# Patient Record
Sex: FEMALE | NOT HISPANIC OR LATINO | Employment: OTHER | URBAN - METROPOLITAN AREA
[De-identification: names, ages, dates, MRNs, and addresses within clinical notes are randomized per-mention and may not be internally consistent; named-entity substitution may affect disease eponyms.]

---

## 2023-05-24 ENCOUNTER — PATIENT OUTREACH (OUTPATIENT)
Dept: CARE COORDINATION | Facility: CLINIC | Age: 83
End: 2023-05-24
Payer: MEDICARE

## 2023-05-25 NOTE — PROGRESS NOTES
Outreach call to patient to support a smooth transition of care from recent admission.  Spoke with patient, reviewed discharge medications, discharge instructions, assessed social needs, and provided education on importance of follow-up appointment with provider. Enrolled patient in [employer] chatbot for additional support and education through transition period.  Will continue to monitor through transition period.  JESSICA Pablo, RN.     Engagement  Call Start Time: 1500 (5/24/2023  3:38 PM)    Medications  Medications reviewed with patient/caregiver?: Yes (5/24/2023  3:38 PM)  Is the patient having any side effects they believe may be caused by any medication additions or changes?: No (5/24/2023  3:38 PM)  Does the patient have all medications ordered at discharge?: Yes (5/24/2023  3:38 PM)  Care Management Interventions: No intervention needed (5/24/2023  3:38 PM)  Is the patient taking all medications as directed (includes completed medication regime)?: Yes (5/24/2023  3:38 PM)    Appointments  Does the patient have a primary care provider?: Yes (5/24/2023  3:38 PM)  Care Management Interventions: Advised patient to make appointment (5/24/2023  3:38 PM)  Has the patient kept scheduled appointments due by today?: Yes (5/24/2023  3:38 PM)  Care Management Interventions: Advised to schedule with specialist (5/24/2023  3:38 PM)    Self Management  What is the home health agency?: None Ordered (5/24/2023  3:38 PM)  Has home health visited the patient within 72 hours of discharge?: Not applicable (5/24/2023  3:38 PM)  What Durable Medical Equipment (DME) was ordered?: None Ordered (5/24/2023  3:38 PM)    Patient Teaching  Does the patient have access to their discharge instructions?: Yes (5/24/2023  3:38 PM)  What is the patient's perception of their health status since discharge?: Improving (5/24/2023  3:38 PM)  Is the patient/caregiver able to teach back the hierarchy of who to call/visit for symptoms/problems? PCP,  Specialist, Home Health nurse, Urgent Care, ED, 911: Yes (5/24/2023  3:38 PM)    Wrap Up  Wrap Up Additional Comments: CM spoke with patient states she is doing well with no chest pain, pt. had cardiac cath on 5/22 during this hospitalization at  [facility].  Pt. incision is healing well per patient. reviewed diet.  discussed with patient to make follow up appointments with PCP and Cardiologist which pt. verbalized understanding.  Pt. had no questions or concerns.  JESSICA Nance, RN. (5/24/2023  3:38 PM)

## 2023-06-06 ENCOUNTER — PATIENT OUTREACH (OUTPATIENT)
Dept: CARE COORDINATION | Facility: CLINIC | Age: 83
End: 2023-06-06
Payer: MEDICARE

## 2023-06-06 NOTE — PROGRESS NOTES
Outreach call to patient to support a smooth transition of care from recent admission.  Spoke with patient, reviewed discharge medications, discharge instructions, assessed social needs, and provided education on importance of follow-up appointment with provider. Enrolled patient in [employer] chatbot for additional support and education through transition period.  Will continue to monitor through transition period.    .JESSICA Carmen, CM    Engagement  Call Start Time: 1145 (6/6/2023 11:53 AM)    Medications  Medications reviewed with patient/caregiver?: Yes (6/6/2023 11:53 AM)  Is the patient having any side effects they believe may be caused by any medication additions or changes?: No (6/6/2023 11:53 AM)  Does the patient have all medications ordered at discharge?: Yes (6/6/2023 11:53 AM)  Care Management Interventions: No intervention needed (6/6/2023 11:53 AM)  Is the patient taking all medications as directed (includes completed medication regime)?: Yes (6/6/2023 11:53 AM)    Appointments  Does the patient have a primary care provider?: Yes (6/6/2023 11:53 AM)  Care Management Interventions: Verified appointment date/time/provider (6/6/2023 11:53 AM)  Has the patient kept scheduled appointments due by today?: Yes (6/6/2023 11:53 AM)  Care Management Interventions: Advised to schedule with specialist (5/24/2023  3:38 PM)    Self Management  What is the home health agency?: None Ordered (6/6/2023 11:53 AM)  Has home health visited the patient within 72 hours of discharge?: Not applicable (6/6/2023 11:53 AM)  What Durable Medical Equipment (DME) was ordered?: None Ordered (6/6/2023 11:53 AM)    Patient Teaching  Does the patient have access to their discharge instructions?: Yes (6/6/2023 11:53 AM)  Care Management Interventions: Reviewed instructions with patient (6/6/2023 11:53 AM)  What is the patient's perception of their health status since discharge?: Improving (6/6/2023 11:53 AM)  Is the patient/caregiver  able to teach back the hierarchy of who to call/visit for symptoms/problems? PCP, Specialist, Home Health nurse, Urgent Care, ED, 911: Yes (6/6/2023 11:53 AM)    Wrap Up  Wrap Up Additional Comments: CM spoke with patient states she is home doing well, incision site from cardiac cath is healing really well, states that she is breathing much better feeling less tired, with energy. Pt. states she is able to complete activites with no issues.  Pt. has seen PCP and Cardiologist and is on new meds states she is doing well with meds no issues there as well.  Pt. aware of upcoming appointments. No questions or concerns per patient.  SACHI CarmenN, RN. (6/6/2023 11:53 AM)  Call End Time: 1155 (6/6/2023 11:53 AM)

## 2023-06-20 ENCOUNTER — PATIENT OUTREACH (OUTPATIENT)
Dept: CARE COORDINATION | Facility: CLINIC | Age: 83
End: 2023-06-20
Payer: MEDICARE

## 2023-06-20 NOTE — PROGRESS NOTES
Outreach call to patient to support a smooth transition of care from recent admission.  Left voicemail message for patient with my contact information.  .JESSICA Carmen, CM

## 2023-07-07 ENCOUNTER — PATIENT OUTREACH (OUTPATIENT)
Dept: CARE COORDINATION | Facility: CLINIC | Age: 83
End: 2023-07-07
Payer: MEDICARE

## 2023-08-07 ENCOUNTER — PATIENT OUTREACH (OUTPATIENT)
Dept: CARE COORDINATION | Facility: CLINIC | Age: 83
End: 2023-08-07
Payer: MEDICARE

## 2023-08-07 NOTE — PROGRESS NOTES
Outreach completed by CM who spoke with patient states she has not had any episodes of shortness of breath or chest pain/discomfort.  Patient is aware of upcoming appointment with cardiology.  Patient had no needs at this time.  JESSICA Pablo, RN.

## 2023-10-18 PROBLEM — E66.9 OBESITY (BMI 30-39.9): Status: ACTIVE | Noted: 2023-10-18

## 2023-10-18 PROBLEM — F40.02 AGORAPHOBIA WITHOUT PANIC DISORDER: Status: ACTIVE | Noted: 2023-10-18

## 2023-10-18 PROBLEM — E78.5 HYPERLIPIDEMIA: Status: ACTIVE | Noted: 2023-10-18

## 2023-10-18 PROBLEM — K63.5 POLYPUS, COLON: Status: ACTIVE | Noted: 2023-10-18

## 2023-10-18 PROBLEM — R73.09 IMPAIRED GLUCOSE METABOLISM: Status: ACTIVE | Noted: 2023-10-18

## 2023-10-18 PROBLEM — N64.89 BREAST ASYMMETRY: Status: ACTIVE | Noted: 2023-10-18

## 2023-10-18 PROBLEM — E55.9 VITAMIN D DEFICIENCY: Status: ACTIVE | Noted: 2023-10-18

## 2023-10-18 PROBLEM — R07.89 CHEST PAIN, MUSCULAR: Status: ACTIVE | Noted: 2023-10-18

## 2023-10-18 PROBLEM — K57.30 DIVERTICULOSIS OF COLON: Status: ACTIVE | Noted: 2023-10-18

## 2023-10-18 PROBLEM — D12.6 ADENOMATOUS COLON POLYP: Status: ACTIVE | Noted: 2023-10-18

## 2023-10-18 PROBLEM — I25.10 ATHEROSCLEROSIS OF CORONARY ARTERY OF NATIVE HEART WITHOUT ANGINA PECTORIS: Status: ACTIVE | Noted: 2023-10-18

## 2023-10-18 PROBLEM — H26.9 CATARACT: Status: ACTIVE | Noted: 2023-10-18

## 2023-10-18 PROBLEM — R42 LIGHT-HEADEDNESS: Status: ACTIVE | Noted: 2023-10-18

## 2023-10-18 PROBLEM — R92.30 DENSE BREAST TISSUE ON MAMMOGRAM: Status: ACTIVE | Noted: 2023-10-18

## 2023-10-18 PROBLEM — N18.30 STAGE 3 CHRONIC KIDNEY DISEASE (MULTI): Status: ACTIVE | Noted: 2023-10-18

## 2023-10-18 PROBLEM — I10 BENIGN ESSENTIAL HYPERTENSION: Status: ACTIVE | Noted: 2023-10-18

## 2023-10-18 PROBLEM — Z95.5 H/O HEART ARTERY STENT: Status: ACTIVE | Noted: 2023-10-18

## 2023-10-18 PROBLEM — N17.9 AKI (ACUTE KIDNEY INJURY) (CMS-HCC): Status: ACTIVE | Noted: 2023-10-18

## 2023-10-18 RX ORDER — NITROGLYCERIN 0.4 MG/1
0.4 TABLET SUBLINGUAL EVERY 5 MIN PRN
COMMUNITY

## 2023-10-18 RX ORDER — GLUCOSAMINE/CHONDR SU A SOD 750-600 MG
1 TABLET ORAL DAILY
COMMUNITY

## 2023-10-18 RX ORDER — ASCORBIC ACID 1000 MG
1 TABLET, EXTENDED RELEASE ORAL DAILY
COMMUNITY
End: 2023-10-19 | Stop reason: ALTCHOICE

## 2023-10-18 RX ORDER — ACETAMINOPHEN 500 MG
4000 TABLET ORAL
COMMUNITY

## 2023-10-18 RX ORDER — TELMISARTAN 40 MG/1
1 TABLET ORAL DAILY
COMMUNITY
End: 2023-10-19 | Stop reason: SDUPTHER

## 2023-10-18 RX ORDER — MULTIVIT-MIN/FA/LYCOPEN/LUTEIN .4-300-25
1 TABLET ORAL DAILY
COMMUNITY
End: 2023-10-19 | Stop reason: ALTCHOICE

## 2023-10-18 RX ORDER — ZINC GLUCONATE 50 MG
50 TABLET ORAL DAILY
COMMUNITY

## 2023-10-18 RX ORDER — LANOLIN ALCOHOL/MO/W.PET/CERES
1 CREAM (GRAM) TOPICAL DAILY
COMMUNITY

## 2023-10-18 RX ORDER — METOPROLOL SUCCINATE 25 MG/1
1 TABLET, EXTENDED RELEASE ORAL DAILY
COMMUNITY
Start: 2022-01-19 | End: 2023-10-19 | Stop reason: SDUPTHER

## 2023-10-18 RX ORDER — CLOPIDOGREL BISULFATE 75 MG/1
75 TABLET ORAL DAILY
COMMUNITY
Start: 2023-06-15 | End: 2023-10-19 | Stop reason: SDUPTHER

## 2023-10-18 RX ORDER — EZETIMIBE 10 MG/1
10 TABLET ORAL DAILY
COMMUNITY
Start: 2023-07-24 | End: 2023-10-19 | Stop reason: SDUPTHER

## 2023-10-19 ENCOUNTER — OFFICE VISIT (OUTPATIENT)
Dept: PRIMARY CARE | Facility: CLINIC | Age: 83
End: 2023-10-19
Payer: MEDICARE

## 2023-10-19 VITALS
OXYGEN SATURATION: 96 % | DIASTOLIC BLOOD PRESSURE: 66 MMHG | SYSTOLIC BLOOD PRESSURE: 138 MMHG | HEIGHT: 60 IN | WEIGHT: 162 LBS | HEART RATE: 72 BPM | BODY MASS INDEX: 31.8 KG/M2

## 2023-10-19 DIAGNOSIS — Q24.5 CORONARY ARTERY ABNORMALITY (HHS-HCC): ICD-10-CM

## 2023-10-19 DIAGNOSIS — I10 BENIGN ESSENTIAL HYPERTENSION: ICD-10-CM

## 2023-10-19 DIAGNOSIS — M54.16 LUMBAR RADICULOPATHY: Primary | ICD-10-CM

## 2023-10-19 PROBLEM — M47.26 OTHER SPONDYLOSIS WITH RADICULOPATHY, LUMBAR REGION: Status: ACTIVE | Noted: 2023-10-19

## 2023-10-19 PROBLEM — C18.9 ADENOCARCINOMA, COLON (MULTI): Status: ACTIVE | Noted: 2023-10-19

## 2023-10-19 PROBLEM — C96.9: Status: ACTIVE | Noted: 2023-10-19

## 2023-10-19 PROBLEM — Z85.038 HISTORY OF COLON CANCER: Status: ACTIVE | Noted: 2023-09-07

## 2023-10-19 PROBLEM — D12.6 BENIGN NEOPLASM OF COLON: Status: ACTIVE | Noted: 2020-01-09

## 2023-10-19 PROBLEM — I25.10 ATHEROSCLEROSIS OF CORONARY ARTERY: Status: ACTIVE | Noted: 2023-10-19

## 2023-10-19 PROBLEM — N18.31 STAGE 3A CHRONIC KIDNEY DISEASE (MULTI): Status: ACTIVE | Noted: 2023-10-19

## 2023-10-19 PROBLEM — K44.9 DIAPHRAGMATIC HERNIA: Status: ACTIVE | Noted: 2022-10-12

## 2023-10-19 PROBLEM — M16.9 OSTEOARTHRITIS OF HIP: Status: ACTIVE | Noted: 2023-10-19

## 2023-10-19 PROBLEM — C18.0 MALIGNANT NEOPLASM OF CECUM (MULTI): Status: ACTIVE | Noted: 2023-10-19

## 2023-10-19 PROBLEM — K56.609 SMALL BOWEL OBSTRUCTION (MULTI): Status: ACTIVE | Noted: 2023-10-19

## 2023-10-19 PROBLEM — K57.30 DIVERTICULAR DISEASE OF COLON: Status: ACTIVE | Noted: 2022-10-12

## 2023-10-19 PROBLEM — N28.1 RENAL CYST: Status: ACTIVE | Noted: 2023-10-19

## 2023-10-19 PROBLEM — I20.0 ANGINA PECTORIS, UNSTABLE (MULTI): Status: ACTIVE | Noted: 2023-10-19

## 2023-10-19 PROCEDURE — 99214 OFFICE O/P EST MOD 30 MIN: CPT | Performed by: STUDENT IN AN ORGANIZED HEALTH CARE EDUCATION/TRAINING PROGRAM

## 2023-10-19 PROCEDURE — 3078F DIAST BP <80 MM HG: CPT | Performed by: STUDENT IN AN ORGANIZED HEALTH CARE EDUCATION/TRAINING PROGRAM

## 2023-10-19 PROCEDURE — 1159F MED LIST DOCD IN RCRD: CPT | Performed by: STUDENT IN AN ORGANIZED HEALTH CARE EDUCATION/TRAINING PROGRAM

## 2023-10-19 PROCEDURE — 3075F SYST BP GE 130 - 139MM HG: CPT | Performed by: STUDENT IN AN ORGANIZED HEALTH CARE EDUCATION/TRAINING PROGRAM

## 2023-10-19 RX ORDER — TELMISARTAN 40 MG/1
40 TABLET ORAL DAILY
Qty: 90 TABLET | Refills: 1 | Status: SHIPPED | OUTPATIENT
Start: 2023-10-19 | End: 2024-01-03

## 2023-10-19 RX ORDER — METOPROLOL SUCCINATE 25 MG/1
25 TABLET, EXTENDED RELEASE ORAL DAILY
Qty: 90 TABLET | Refills: 1 | Status: SHIPPED | OUTPATIENT
Start: 2023-10-19 | End: 2024-01-03

## 2023-10-19 RX ORDER — CLOPIDOGREL BISULFATE 75 MG/1
75 TABLET ORAL DAILY
Qty: 90 TABLET | Refills: 1 | Status: SHIPPED | OUTPATIENT
Start: 2023-10-19 | End: 2024-01-30

## 2023-10-19 RX ORDER — EZETIMIBE 10 MG/1
10 TABLET ORAL DAILY
Qty: 90 TABLET | Refills: 1 | Status: SHIPPED | OUTPATIENT
Start: 2023-10-19 | End: 2024-01-30

## 2023-10-19 ASSESSMENT — ENCOUNTER SYMPTOMS
HEADACHES: 0
VOMITING: 0
FEVER: 0
DIARRHEA: 0
HALLUCINATIONS: 0
EYE DISCHARGE: 0
SHORTNESS OF BREATH: 0
DYSURIA: 0
COUGH: 0
SORE THROAT: 0
PALPITATIONS: 0
FREQUENCY: 0
HEMATURIA: 0
NAUSEA: 0
POLYDIPSIA: 0
FATIGUE: 0
APPETITE CHANGE: 0
WHEEZING: 0
CONSTIPATION: 0
MYALGIAS: 0
ABDOMINAL PAIN: 0
EYE PAIN: 0

## 2023-10-19 ASSESSMENT — PATIENT HEALTH QUESTIONNAIRE - PHQ9
2. FEELING DOWN, DEPRESSED OR HOPELESS: NOT AT ALL
SUM OF ALL RESPONSES TO PHQ9 QUESTIONS 1 AND 2: 1
1. LITTLE INTEREST OR PLEASURE IN DOING THINGS: SEVERAL DAYS
10. IF YOU CHECKED OFF ANY PROBLEMS, HOW DIFFICULT HAVE THESE PROBLEMS MADE IT FOR YOU TO DO YOUR WORK, TAKE CARE OF THINGS AT HOME, OR GET ALONG WITH OTHER PEOPLE: SOMEWHAT DIFFICULT

## 2023-10-19 NOTE — PROGRESS NOTES
Subjective   Patient ID: Bere Mota is a 83 y.o. female who presents for Results.    HPI   Patient is here for follow-up of hypertension, hyperlipidemia, CAD.  Has been tolerating her medications well.  Denies adverse reaction.  Blood pressure today 138/66.  Denies any chest pain, shortness of breath, headaches.  Wants to continue the current medication regimen.  Her only concern is chronic pain.  Has been having a lot of low back and bilateral hip and groin pain.  Knows that she has a bad hip and low back.  Unsure as to which 1 she should evaluate first.  Would like to see a specialist regardless of her orthopedic concerns.  Denies any loss in bowel or bladder.  Has a hard time ambulating secondary to pain.    Review of Systems   Constitutional:  Negative for appetite change, fatigue and fever.   HENT:  Negative for congestion, ear discharge, ear pain, hearing loss and sore throat.    Eyes:  Negative for pain and discharge.   Respiratory:  Negative for cough, shortness of breath and wheezing.    Cardiovascular:  Negative for chest pain, palpitations and leg swelling.   Gastrointestinal:  Negative for abdominal pain, constipation, diarrhea, nausea and vomiting.   Endocrine: Negative for cold intolerance, heat intolerance and polydipsia.   Genitourinary:  Negative for dysuria, frequency and hematuria.   Musculoskeletal:  Negative for gait problem and myalgias.   Skin:  Negative for rash.   Neurological:  Negative for syncope and headaches.   Psychiatric/Behavioral:  Negative for hallucinations and suicidal ideas.        Objective   /66   Pulse 72   Ht 1.524 m (5')   Wt 73.5 kg (162 lb)   SpO2 96%   BMI 31.64 kg/m²     Physical Exam  Constitutional:       Appearance: Normal appearance.   HENT:      Head: Normocephalic and atraumatic.      Right Ear: External ear normal.      Left Ear: External ear normal.      Nose: Nose normal.      Mouth/Throat:      Mouth: Mucous membranes are moist.   Eyes:       Extraocular Movements: Extraocular movements intact.      Conjunctiva/sclera: Conjunctivae normal.      Pupils: Pupils are equal, round, and reactive to light.   Cardiovascular:      Rate and Rhythm: Normal rate and regular rhythm.      Pulses: Normal pulses.      Heart sounds: Normal heart sounds.   Pulmonary:      Effort: Pulmonary effort is normal.      Breath sounds: Normal breath sounds.   Abdominal:      General: Abdomen is flat.      Palpations: Abdomen is soft.   Neurological:      General: No focal deficit present.      Mental Status: She is alert and oriented to person, place, and time.   Psychiatric:         Mood and Affect: Mood normal.         Behavior: Behavior normal.       Assessment/Plan   Referral to physical medicine for further evaluation.  Continue current medication regimen.

## 2023-10-23 ENCOUNTER — PATIENT OUTREACH (OUTPATIENT)
Dept: CARE COORDINATION | Facility: CLINIC | Age: 83
End: 2023-10-23
Payer: MEDICARE

## 2023-10-23 NOTE — PROGRESS NOTES
Chart reviewed, CM outreached to patient states she is doing okay at home, she is having hip and back pain, Patient has been seen by PCP on 10/19 at that time referral was placed for [facility].  Patient has not received any calls, CM outreached to [facility] for PT orders which they don't have no referrals listed in either of their systems, CM will continue to follow up.  Patient states she walks with a walker at home sometimes, Patient is able to complete ADLs/IADLs, Patient transport self to her appointments, Patient is aware of upcoming appointments.      Waleska Munoz, SACHIN, RN

## 2023-10-26 ENCOUNTER — TELEPHONE (OUTPATIENT)
Dept: PRIMARY CARE | Facility: CLINIC | Age: 83
End: 2023-10-26
Payer: MEDICARE

## 2023-10-26 DIAGNOSIS — I10 BENIGN ESSENTIAL HYPERTENSION: ICD-10-CM

## 2023-10-26 DIAGNOSIS — R73.09 IMPAIRED GLUCOSE METABOLISM: ICD-10-CM

## 2023-10-26 DIAGNOSIS — I10 PRIMARY HYPERTENSION: ICD-10-CM

## 2023-10-26 DIAGNOSIS — E78.2 MIXED HYPERLIPIDEMIA: Primary | ICD-10-CM

## 2023-10-26 DIAGNOSIS — E55.9 VITAMIN D DEFICIENCY: ICD-10-CM

## 2024-01-02 DIAGNOSIS — I10 BENIGN ESSENTIAL HYPERTENSION: ICD-10-CM

## 2024-01-03 RX ORDER — METOPROLOL SUCCINATE 25 MG/1
25 TABLET, EXTENDED RELEASE ORAL DAILY
Qty: 100 TABLET | Refills: 2 | Status: SHIPPED | OUTPATIENT
Start: 2024-01-03 | End: 2024-02-07 | Stop reason: SDUPTHER

## 2024-01-03 RX ORDER — TELMISARTAN 40 MG/1
40 TABLET ORAL DAILY
Qty: 100 TABLET | Refills: 2 | Status: SHIPPED | OUTPATIENT
Start: 2024-01-03 | End: 2024-02-07 | Stop reason: SDUPTHER

## 2024-02-07 ENCOUNTER — OFFICE VISIT (OUTPATIENT)
Dept: PRIMARY CARE | Facility: CLINIC | Age: 84
End: 2024-02-07
Payer: MEDICARE

## 2024-02-07 VITALS
SYSTOLIC BLOOD PRESSURE: 158 MMHG | HEART RATE: 68 BPM | DIASTOLIC BLOOD PRESSURE: 80 MMHG | WEIGHT: 161 LBS | TEMPERATURE: 98 F | BODY MASS INDEX: 31.61 KG/M2 | HEIGHT: 60 IN

## 2024-02-07 DIAGNOSIS — G89.29 CHRONIC RIGHT-SIDED LOW BACK PAIN WITH RIGHT-SIDED SCIATICA: Primary | ICD-10-CM

## 2024-02-07 DIAGNOSIS — Q24.5 CORONARY ARTERY ABNORMALITY (HHS-HCC): ICD-10-CM

## 2024-02-07 DIAGNOSIS — I10 BENIGN ESSENTIAL HYPERTENSION: ICD-10-CM

## 2024-02-07 DIAGNOSIS — M54.41 CHRONIC RIGHT-SIDED LOW BACK PAIN WITH RIGHT-SIDED SCIATICA: Primary | ICD-10-CM

## 2024-02-07 DIAGNOSIS — E78.2 MIXED HYPERLIPIDEMIA: ICD-10-CM

## 2024-02-07 DIAGNOSIS — M16.11 PRIMARY OSTEOARTHRITIS OF RIGHT HIP: ICD-10-CM

## 2024-02-07 PROCEDURE — 3077F SYST BP >= 140 MM HG: CPT | Performed by: INTERNAL MEDICINE

## 2024-02-07 PROCEDURE — 1125F AMNT PAIN NOTED PAIN PRSNT: CPT | Performed by: INTERNAL MEDICINE

## 2024-02-07 PROCEDURE — 3079F DIAST BP 80-89 MM HG: CPT | Performed by: INTERNAL MEDICINE

## 2024-02-07 PROCEDURE — 1160F RVW MEDS BY RX/DR IN RCRD: CPT | Performed by: INTERNAL MEDICINE

## 2024-02-07 PROCEDURE — 1159F MED LIST DOCD IN RCRD: CPT | Performed by: INTERNAL MEDICINE

## 2024-02-07 PROCEDURE — 99214 OFFICE O/P EST MOD 30 MIN: CPT | Performed by: INTERNAL MEDICINE

## 2024-02-07 PROCEDURE — 1036F TOBACCO NON-USER: CPT | Performed by: INTERNAL MEDICINE

## 2024-02-07 RX ORDER — METOPROLOL SUCCINATE 25 MG/1
25 TABLET, EXTENDED RELEASE ORAL DAILY
Qty: 90 TABLET | Refills: 1 | Status: SHIPPED | OUTPATIENT
Start: 2024-02-07 | End: 2024-04-04

## 2024-02-07 RX ORDER — EZETIMIBE 10 MG/1
10 TABLET ORAL DAILY
Qty: 90 TABLET | Refills: 1 | Status: SHIPPED | OUTPATIENT
Start: 2024-02-07 | End: 2024-05-27

## 2024-02-07 RX ORDER — CLOPIDOGREL BISULFATE 75 MG/1
75 TABLET ORAL DAILY
Qty: 90 TABLET | Refills: 1 | Status: SHIPPED | OUTPATIENT
Start: 2024-02-07 | End: 2024-05-27

## 2024-02-07 RX ORDER — TELMISARTAN 40 MG/1
40 TABLET ORAL DAILY
Qty: 90 TABLET | Refills: 1 | Status: SHIPPED | OUTPATIENT
Start: 2024-02-07 | End: 2024-03-05 | Stop reason: SDUPTHER

## 2024-02-07 NOTE — PROGRESS NOTES
Subjective   Patient ID: Bere Mota is a 83 y.o. female who presents for Back Pain (Bazck and hip pain on going currently in PT ).    HPI Patient is having right hip pain and right sided low back pain that started several years ago.  The symptoms gradually got worse over the last few years. Patient thinks the hip pain is worse. Patient is seeing the physical therapy at [facility] and exercises at home without improvement.  Patient takes Tylenol for the pain and it helps some.  Pain is worse in the morning.  Patient tried Tramadol but it had side effects of causing sedation and the pain was still there. Pain radiates to the right groin. Pain also radiates down to the right knee.  Sitting too long makes the symptoms worse.  She had originally seen Dr. Lela Brown who referred her for hip injections and hip injections.     Review of Systems    Objective   /80   Pulse 68   Temp 36.7 °C (98 °F) (Temporal)   Ht 1.524 m (5')   Wt 73 kg (161 lb)   BMI 31.44 kg/m²     Physical Exam    Assessment/Plan   Problem List Items Addressed This Visit             ICD-10-CM    Benign essential hypertension I10    Relevant Medications    metoprolol succinate XL (Toprol-XL) 25 mg 24 hr tablet    telmisartan (MIcarDIS) 40 mg tablet    Hyperlipidemia E78.5    Coronary artery abnormality Q24.5    Relevant Medications    metoprolol succinate XL (Toprol-XL) 25 mg 24 hr tablet    clopidogrel (Plavix) 75 mg tablet    ezetimibe (Zetia) 10 mg tablet    Osteoarthritis of hip M16.9    Relevant Orders    Referral to Orthopaedic Surgery    Chronic right-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

## 2024-02-13 ENCOUNTER — TELEPHONE (OUTPATIENT)
Dept: CARDIOLOGY | Facility: CLINIC | Age: 84
End: 2024-02-13
Payer: MEDICARE

## 2024-02-13 NOTE — TELEPHONE ENCOUNTER
----- Message from Pablo Dow DO sent at 2/13/2024  8:18 AM EST -----  Patient's x-ray of the lumbar spine shows significant degenerative changes with arthritis and disc space narrowing.  Recommend she pursue the appointment with spine doctor as well as orthopedic specialist.

## 2024-02-14 ENCOUNTER — TELEPHONE (OUTPATIENT)
Dept: PRIMARY CARE | Facility: CLINIC | Age: 84
End: 2024-02-14
Payer: MEDICARE

## 2024-02-14 NOTE — TELEPHONE ENCOUNTER
States went to ER for elevated blood pressure     Advised to take double her bp medication by ER. She said she just got a refill sen to her and has plenty of meds. I asked her to make a ER follow up     She was transferred to the  for scheduling

## 2024-02-28 ENCOUNTER — TELEPHONE (OUTPATIENT)
Dept: PRIMARY CARE | Facility: CLINIC | Age: 84
End: 2024-02-28
Payer: MEDICARE

## 2024-02-28 NOTE — TELEPHONE ENCOUNTER
Pt called and stated her blood pressure has been erratic.  She woke up in middle of the night profusely sweating   She contacted an online doc at 2 am    Today she took bp in morning 134/74 8am  She took her bp and plavix at that time. At 10 30AM her bp was 163/74 (after BP meds)     She has been keeping track of BP for a week and lowest was 109/77 and the highest was 195/101      I asked her to schedule an appt Asap. The only appointment available was Monday 3/4.    She also stopped zetia a couple of days ago because she read that it could increase blood pressure.     She feels worse when she takes the plavix.     I was able to get an appt with card tomorrow for her

## 2024-02-29 ENCOUNTER — OFFICE VISIT (OUTPATIENT)
Dept: CARDIOLOGY | Facility: CLINIC | Age: 84
End: 2024-02-29
Payer: MEDICARE

## 2024-02-29 VITALS
WEIGHT: 160 LBS | TEMPERATURE: 97.5 F | SYSTOLIC BLOOD PRESSURE: 140 MMHG | HEART RATE: 74 BPM | DIASTOLIC BLOOD PRESSURE: 76 MMHG | BODY MASS INDEX: 30.21 KG/M2 | HEIGHT: 61 IN

## 2024-02-29 DIAGNOSIS — I10 BENIGN ESSENTIAL HYPERTENSION: Primary | ICD-10-CM

## 2024-02-29 DIAGNOSIS — I25.10 ATHEROSCLEROSIS OF NATIVE CORONARY ARTERY OF NATIVE HEART WITHOUT ANGINA PECTORIS: ICD-10-CM

## 2024-02-29 DIAGNOSIS — E78.2 MIXED HYPERLIPIDEMIA: ICD-10-CM

## 2024-02-29 PROBLEM — M54.41 CHRONIC RIGHT-SIDED LOW BACK PAIN WITH RIGHT-SIDED SCIATICA: Status: ACTIVE | Noted: 2024-02-29

## 2024-02-29 PROBLEM — G89.29 CHRONIC RIGHT-SIDED LOW BACK PAIN WITH RIGHT-SIDED SCIATICA: Status: ACTIVE | Noted: 2024-02-29

## 2024-02-29 PROCEDURE — 1125F AMNT PAIN NOTED PAIN PRSNT: CPT | Performed by: INTERNAL MEDICINE

## 2024-02-29 PROCEDURE — 1159F MED LIST DOCD IN RCRD: CPT | Performed by: INTERNAL MEDICINE

## 2024-02-29 PROCEDURE — 1036F TOBACCO NON-USER: CPT | Performed by: INTERNAL MEDICINE

## 2024-02-29 PROCEDURE — 3078F DIAST BP <80 MM HG: CPT | Performed by: INTERNAL MEDICINE

## 2024-02-29 PROCEDURE — 3077F SYST BP >= 140 MM HG: CPT | Performed by: INTERNAL MEDICINE

## 2024-02-29 PROCEDURE — 1160F RVW MEDS BY RX/DR IN RCRD: CPT | Performed by: INTERNAL MEDICINE

## 2024-02-29 PROCEDURE — 99214 OFFICE O/P EST MOD 30 MIN: CPT | Performed by: INTERNAL MEDICINE

## 2024-02-29 RX ORDER — HYDROCHLOROTHIAZIDE 25 MG/1
12.5 TABLET ORAL DAILY
Qty: 15 TABLET | Refills: 1 | Status: SHIPPED | OUTPATIENT
Start: 2024-02-29 | End: 2024-04-30 | Stop reason: SINTOL

## 2024-02-29 ASSESSMENT — PAIN SCALES - GENERAL: PAINLEVEL: 4

## 2024-02-29 NOTE — PROGRESS NOTES
1. CAD NSTEMI, May 2023 circumflex stenting  2. Hypertension  3. Hyperlipidemia  4. Overweight BMI 30.1  5. noncompliant with medical advice    Patient is a pleasant 83-year-old female who presents today for follow-up emergency room visit, she states that about 2 weeks ago she was seen in the emergency room for elevated blood pressure when her metoprolol and telmisartan were increased to 1 tablet twice a day, since she has continued to take her blood pressures at home that remained at least moderately elevated, she states that she has been having a lot of difficulty with hip and back pain, she states that she has remained compliant with a low-sodium diet and her medications.  She has no complaints of orthopnea PND palpitation or syncopal episode.    BMI is elevated at 30.2, blood pressure 140/76 mmHg and the heart rate of 74 bpm  Elderly female in no acute distress speaking full sentences no carotid bruits upstroke volumes are normal, heart rate and rhythm are regular S1 and S2 are normal intensity no gallop or murmurs are appreciated lungs decreased breath sound but clear abdomen is obese positive bowel sounds soft and nontender, and the lower extremities have no edema    She states that this morning she to her blood samples obtained results are pending    Coronary artery disease with prior circumflex stenting last evaluation in May 2023 currently with no complaints exertional chest pain or shortness of breath we will continue with her clinical follow  Hypertension poorly controlled likely contributed by the pain and discomfort of the hip and back, patient is to continue with the metoprolol and telmisartan twice daily dosing as was requested by the emergency room, patient is also recommended to start hydrochlorothiazide 12.5 mg daily, to continue with the blood pressure readings at home and to call my office in 2 weeks should the blood pressure remain elevated   Overweight heart healthy diet and weight loss and  low-sodium diet and activity as tolerated was discussed and recommended  Patient is provided with basic metabolic profile to obtain in 3-4 days after starting hydrochlorothiazide  Return to my clinic in 1 month.

## 2024-03-04 ENCOUNTER — APPOINTMENT (OUTPATIENT)
Dept: PRIMARY CARE | Facility: CLINIC | Age: 84
End: 2024-03-04
Payer: MEDICARE

## 2024-03-05 DIAGNOSIS — I10 BENIGN ESSENTIAL HYPERTENSION: ICD-10-CM

## 2024-03-05 RX ORDER — TELMISARTAN 40 MG/1
80 TABLET ORAL DAILY
Qty: 180 TABLET | Refills: 1 | Status: SHIPPED | OUTPATIENT
Start: 2024-03-05 | End: 2024-03-13 | Stop reason: SDUPTHER

## 2024-03-06 NOTE — TELEPHONE ENCOUNTER
Called the pt she is good. Spoke to her nurse and she thinks that pt is not taking her medications as prescribed. The nurse will continue to monitor the pt and call with any concerns. Pt has been taking 1 metoprolol not 2 per day.

## 2024-03-13 DIAGNOSIS — I10 BENIGN ESSENTIAL HYPERTENSION: ICD-10-CM

## 2024-03-13 RX ORDER — TELMISARTAN 40 MG/1
80 TABLET ORAL DAILY
Qty: 180 TABLET | Refills: 1 | Status: SHIPPED | OUTPATIENT
Start: 2024-03-13 | End: 2024-03-21 | Stop reason: SDUPTHER

## 2024-03-21 ENCOUNTER — TELEPHONE (OUTPATIENT)
Dept: CARDIOLOGY | Facility: CLINIC | Age: 84
End: 2024-03-21

## 2024-03-21 ENCOUNTER — OFFICE VISIT (OUTPATIENT)
Dept: CARDIOLOGY | Facility: CLINIC | Age: 84
End: 2024-03-21
Payer: MEDICARE

## 2024-03-21 VITALS
TEMPERATURE: 97.5 F | HEIGHT: 61 IN | SYSTOLIC BLOOD PRESSURE: 124 MMHG | WEIGHT: 157 LBS | DIASTOLIC BLOOD PRESSURE: 78 MMHG | HEART RATE: 68 BPM | BODY MASS INDEX: 29.64 KG/M2

## 2024-03-21 DIAGNOSIS — I25.10 ATHEROSCLEROSIS OF CORONARY ARTERY OF NATIVE HEART WITHOUT ANGINA PECTORIS, UNSPECIFIED VESSEL OR LESION TYPE: Primary | ICD-10-CM

## 2024-03-21 DIAGNOSIS — I10 BENIGN ESSENTIAL HYPERTENSION: ICD-10-CM

## 2024-03-21 DIAGNOSIS — Z01.810 PREOP CARDIOVASCULAR EXAM: ICD-10-CM

## 2024-03-21 DIAGNOSIS — Z95.5 H/O HEART ARTERY STENT: ICD-10-CM

## 2024-03-21 DIAGNOSIS — E78.2 MIXED HYPERLIPIDEMIA: ICD-10-CM

## 2024-03-21 DIAGNOSIS — Q24.5 CORONARY ARTERY ABNORMALITY (HHS-HCC): ICD-10-CM

## 2024-03-21 PROCEDURE — 1160F RVW MEDS BY RX/DR IN RCRD: CPT | Performed by: INTERNAL MEDICINE

## 2024-03-21 PROCEDURE — 1125F AMNT PAIN NOTED PAIN PRSNT: CPT | Performed by: INTERNAL MEDICINE

## 2024-03-21 PROCEDURE — 1036F TOBACCO NON-USER: CPT | Performed by: INTERNAL MEDICINE

## 2024-03-21 PROCEDURE — 1159F MED LIST DOCD IN RCRD: CPT | Performed by: INTERNAL MEDICINE

## 2024-03-21 PROCEDURE — 3078F DIAST BP <80 MM HG: CPT | Performed by: INTERNAL MEDICINE

## 2024-03-21 PROCEDURE — 3074F SYST BP LT 130 MM HG: CPT | Performed by: INTERNAL MEDICINE

## 2024-03-21 PROCEDURE — 99214 OFFICE O/P EST MOD 30 MIN: CPT | Performed by: INTERNAL MEDICINE

## 2024-03-21 RX ORDER — REGADENOSON 0.08 MG/ML
0.4 INJECTION, SOLUTION INTRAVENOUS
OUTPATIENT
Start: 2024-03-21

## 2024-03-21 RX ORDER — EZETIMIBE 10 MG/1
10 TABLET ORAL DAILY
Qty: 90 TABLET | Refills: 1 | Status: CANCELLED | OUTPATIENT
Start: 2024-03-21 | End: 2024-09-17

## 2024-03-21 RX ORDER — CLOPIDOGREL BISULFATE 75 MG/1
75 TABLET ORAL DAILY
Qty: 90 TABLET | Refills: 1 | Status: CANCELLED | OUTPATIENT
Start: 2024-03-21 | End: 2024-09-17

## 2024-03-21 ASSESSMENT — PAIN SCALES - GENERAL: PAINLEVEL: 4

## 2024-03-21 NOTE — TELEPHONE ENCOUNTER
NUCLEAR STRESS TEST/[id number] NO AUTH REQUIRED PER [facility] PORTAL REF# [id number]

## 2024-03-21 NOTE — PROGRESS NOTES
1. CAD NSTEMI, May 2023 circumflex stenting  2. Hypertension  3. Hyperlipidemia  4. Overweight BMI 30.1  5. noncompliant with medical advice    Patient is a pleasant 89-year-old female with the above-noted pertinent past medical history who presents today for follow-up.  During the last visit it was noted that the blood pressure was under suboptimal control, hydrochlorothiazide 12.5 mg was added to her regimen, she presents today with number of blood pressure readings at home they are all under very good control, she has no side effects of orthostatic lightheadedness dizziness, she states that now that her blood pressure is under control she wishes to proceed with her hip surgery, her level of activity has been limited due to her arthritic discomfort.  She has no complaints of chest pain or shortness of breath.    Vital signs reviewed and stable today BMI of 29.7, blood pressure 124/78 mmHg (previously 140s/76 mmHg) and a heart rate of 68 bpm  Elderly female in no acute distress speaking full sentences no carotid bruits heart rate and rhythm are regular S1-S2 are normal no gallop or murmurs, lungs are clear to auscultation abdomen is moderately distended positive bowel sounds soft and nontender    Coronary artery disease prior non-ST segment elevation myocardial infarction circumflex stenting dating back to 2 5/2023 was currently contemplating left hip replacement, patient's level of activity is very limited, recommendation for Lexiscan nuclear test for further ischemic evaluation was made  Hypertension under much better control with the addition hydrochlorothiazide patient is recommended to continue with telmisartan metoprolol  Should the stress test revealed no evidence of stress-induced ischemia patient is identified little cardiovascular risk and may proceed to her surgery.  Return to my clinic in 6 months for follow-up.

## 2024-03-27 ENCOUNTER — TELEPHONE (OUTPATIENT)
Dept: CARDIOLOGY | Facility: CLINIC | Age: 84
End: 2024-03-27
Payer: MEDICARE

## 2024-03-27 NOTE — TELEPHONE ENCOUNTER
No auth required for Stress Test that Dr. Ty ordered. Scheduled patient at [facility] on 4/1/24 with an arrival time of 9:30am. Called patient and notified her of the day/time of the stress test and the prep for testing.

## 2024-03-28 RX ORDER — TELMISARTAN 40 MG/1
80 TABLET ORAL DAILY
Qty: 180 TABLET | Refills: 1 | Status: SHIPPED | OUTPATIENT
Start: 2024-03-28 | End: 2024-04-09

## 2024-04-03 DIAGNOSIS — I10 BENIGN ESSENTIAL HYPERTENSION: ICD-10-CM

## 2024-04-04 RX ORDER — METOPROLOL SUCCINATE 25 MG/1
25 TABLET, EXTENDED RELEASE ORAL DAILY
Qty: 100 TABLET | Refills: 2 | Status: SHIPPED | OUTPATIENT
Start: 2024-04-04

## 2024-04-05 ENCOUNTER — TELEPHONE (OUTPATIENT)
Dept: PRIMARY CARE | Facility: CLINIC | Age: 84
End: 2024-04-05
Payer: MEDICARE

## 2024-04-05 NOTE — TELEPHONE ENCOUNTER
Pt stating her blood pressure is not controlled. She also said her insurance company told her she should not be taking telmisartan  I requested that patient schedule an appointment for BP check and med management

## 2024-04-09 ENCOUNTER — OFFICE VISIT (OUTPATIENT)
Dept: CARDIOLOGY | Facility: CLINIC | Age: 84
End: 2024-04-09
Payer: MEDICARE

## 2024-04-09 VITALS
HEIGHT: 61 IN | HEART RATE: 76 BPM | DIASTOLIC BLOOD PRESSURE: 88 MMHG | BODY MASS INDEX: 29.83 KG/M2 | TEMPERATURE: 96 F | WEIGHT: 158 LBS | SYSTOLIC BLOOD PRESSURE: 154 MMHG

## 2024-04-09 DIAGNOSIS — E66.9 OBESITY (BMI 30-39.9): ICD-10-CM

## 2024-04-09 DIAGNOSIS — E78.2 MIXED HYPERLIPIDEMIA: ICD-10-CM

## 2024-04-09 DIAGNOSIS — I10 BENIGN ESSENTIAL HYPERTENSION: Primary | ICD-10-CM

## 2024-04-09 DIAGNOSIS — I25.10 ATHEROSCLEROSIS OF NATIVE CORONARY ARTERY OF NATIVE HEART WITHOUT ANGINA PECTORIS: ICD-10-CM

## 2024-04-09 PROCEDURE — 3079F DIAST BP 80-89 MM HG: CPT | Performed by: INTERNAL MEDICINE

## 2024-04-09 PROCEDURE — 1160F RVW MEDS BY RX/DR IN RCRD: CPT | Performed by: INTERNAL MEDICINE

## 2024-04-09 PROCEDURE — 1159F MED LIST DOCD IN RCRD: CPT | Performed by: INTERNAL MEDICINE

## 2024-04-09 PROCEDURE — 99214 OFFICE O/P EST MOD 30 MIN: CPT | Performed by: INTERNAL MEDICINE

## 2024-04-09 PROCEDURE — 1126F AMNT PAIN NOTED NONE PRSNT: CPT | Performed by: INTERNAL MEDICINE

## 2024-04-09 PROCEDURE — 1036F TOBACCO NON-USER: CPT | Performed by: INTERNAL MEDICINE

## 2024-04-09 PROCEDURE — 3077F SYST BP >= 140 MM HG: CPT | Performed by: INTERNAL MEDICINE

## 2024-04-09 RX ORDER — VALSARTAN 80 MG/1
80 TABLET ORAL DAILY
Qty: 30 TABLET | Refills: 1 | Status: SHIPPED | OUTPATIENT
Start: 2024-04-09 | End: 2024-04-12 | Stop reason: SDUPTHER

## 2024-04-09 ASSESSMENT — PAIN SCALES - GENERAL: PAINLEVEL: 0-NO PAIN

## 2024-04-09 NOTE — PROGRESS NOTES
1. CAD NSTEMI, May 2023 circumflex stenting  2. Hypertension  3. Hyperlipidemia  4. Overweight BMI 30.1  5. noncompliant with medical advice    Patient is a pleasant 83-year-old female with the above-noted pertinent past medical history who presents today for follow-up of her stress test and hypertension.  She states that she had to continue the 12.5 mg hydrochlorothiazide due to inability to tolerate, headaches and bodyaches, she has no complaints of chest pain but level of activity due to the degenerative arthritic discomfort remains limited, she also shares with me that telmisartan medication is no longer covered by her insurance and wishing to have a change.  She denies orthopnea PND palpitation or syncopal episode    BMI today is elevated at 30  , Blood pressure 154/88 mmHg and heart rate of 76 bpm elderly female in no acute distress speaking full sentences no carotid bruits heart rate and rhythm are regular S1 and S2 are normal no gallop murmurs or rubs appreciated lungs clear to auscultation abdomen is obese positive bowel sounds soft and nontender    Lexiscan nuclear test 4/2024  No evidence of stress-induced ischemia or prior myocardial infarction and LVEF was noted to be normal at 65%    Coronary artery disease as part of preoperative evaluation completed a Lexiscan nuclear test that revealed no ischemic findings, LVEF is normal 65% patient identified low perioperative cardiovascular risk she may proceed with her scheduled surgery after the blood pressure is under better control  Hypertension with poor control unable to tolerate 12.5 mg of hydrochlorothiazide, and insurance declined telmisartan patient is requested to start valsartan 80 mg 1 tablet daily patient is to continue with the blood pressure readings at home and to notify my office elevated blood pressures in excess of 140/90 mmHg  Nonpharmacological ways of reducing blood pressure including weight loss and low-sodium diet was discussed and  recommended  Patient is a non-smoker  Return to my clinic in 1 month.

## 2024-04-12 DIAGNOSIS — I10 BENIGN ESSENTIAL HYPERTENSION: ICD-10-CM

## 2024-04-12 DIAGNOSIS — E78.2 MIXED HYPERLIPIDEMIA: ICD-10-CM

## 2024-04-12 DIAGNOSIS — I25.10 ATHEROSCLEROSIS OF NATIVE CORONARY ARTERY OF NATIVE HEART WITHOUT ANGINA PECTORIS: ICD-10-CM

## 2024-04-12 DIAGNOSIS — E66.9 OBESITY (BMI 30-39.9): ICD-10-CM

## 2024-04-15 ENCOUNTER — APPOINTMENT (OUTPATIENT)
Dept: PRIMARY CARE | Facility: CLINIC | Age: 84
End: 2024-04-15
Payer: MEDICARE

## 2024-04-15 RX ORDER — VALSARTAN 80 MG/1
80 TABLET ORAL DAILY
Qty: 30 TABLET | Refills: 1 | Status: SHIPPED | OUTPATIENT
Start: 2024-04-15 | End: 2024-04-30 | Stop reason: SINTOL

## 2024-04-22 ENCOUNTER — TELEPHONE (OUTPATIENT)
Dept: PRIMARY CARE | Facility: CLINIC | Age: 84
End: 2024-04-22

## 2024-04-22 NOTE — TELEPHONE ENCOUNTER
Pt called she has been taking valsartan for a week now and she is experiencing headaches and dizziness. Pt is afraid to drive. Her bp goes up when she takes the med.

## 2024-04-30 ENCOUNTER — OFFICE VISIT (OUTPATIENT)
Dept: PRIMARY CARE | Facility: CLINIC | Age: 84
End: 2024-04-30
Payer: MEDICARE

## 2024-04-30 VITALS
HEART RATE: 70 BPM | BODY MASS INDEX: 30.21 KG/M2 | WEIGHT: 160 LBS | HEIGHT: 61 IN | TEMPERATURE: 98 F | SYSTOLIC BLOOD PRESSURE: 168 MMHG | DIASTOLIC BLOOD PRESSURE: 81 MMHG

## 2024-04-30 DIAGNOSIS — I10 BENIGN ESSENTIAL HYPERTENSION: Primary | ICD-10-CM

## 2024-04-30 DIAGNOSIS — Z95.5 H/O HEART ARTERY STENT: ICD-10-CM

## 2024-04-30 PROCEDURE — 1159F MED LIST DOCD IN RCRD: CPT | Performed by: INTERNAL MEDICINE

## 2024-04-30 PROCEDURE — 1170F FXNL STATUS ASSESSED: CPT | Performed by: INTERNAL MEDICINE

## 2024-04-30 PROCEDURE — 99213 OFFICE O/P EST LOW 20 MIN: CPT | Performed by: INTERNAL MEDICINE

## 2024-04-30 PROCEDURE — 3079F DIAST BP 80-89 MM HG: CPT | Performed by: INTERNAL MEDICINE

## 2024-04-30 PROCEDURE — 1160F RVW MEDS BY RX/DR IN RCRD: CPT | Performed by: INTERNAL MEDICINE

## 2024-04-30 PROCEDURE — 3077F SYST BP >= 140 MM HG: CPT | Performed by: INTERNAL MEDICINE

## 2024-04-30 PROCEDURE — 1036F TOBACCO NON-USER: CPT | Performed by: INTERNAL MEDICINE

## 2024-04-30 RX ORDER — LOSARTAN POTASSIUM 25 MG/1
25 TABLET ORAL DAILY
Qty: 90 TABLET | Refills: 3 | Status: SHIPPED | OUTPATIENT
Start: 2024-04-30 | End: 2024-06-06 | Stop reason: SDUPTHER

## 2024-04-30 ASSESSMENT — ACTIVITIES OF DAILY LIVING (ADL)
TOILETING: INDEPENDENT
PATIENT'S MEMORY ADEQUATE TO SAFELY COMPLETE DAILY ACTIVITIES?: YES
BATHING: INDEPENDENT
FEEDING YOURSELF: INDEPENDENT
HEARING - LEFT EAR: FUNCTIONAL
GROCERY_SHOPPING: INDEPENDENT
TAKING_MEDICATION: INDEPENDENT
JUDGMENT_ADEQUATE_SAFELY_COMPLETE_DAILY_ACTIVITIES: YES
WALKS IN HOME: INDEPENDENT
ADEQUATE_TO_COMPLETE_ADL: YES
HEARING - RIGHT EAR: FUNCTIONAL
DOING_HOUSEWORK: INDEPENDENT
MANAGING_FINANCES: INDEPENDENT
GROOMING: INDEPENDENT
DRESSING YOURSELF: INDEPENDENT

## 2024-04-30 ASSESSMENT — ENCOUNTER SYMPTOMS
LOSS OF SENSATION IN FEET: 0
FEVER: 0
ABDOMINAL PAIN: 0
DIZZINESS: 1
COUGH: 0
HEADACHES: 1
PALPITATIONS: 0
CONFUSION: 0
DEPRESSION: 0
CHILLS: 0
SHORTNESS OF BREATH: 0
OCCASIONAL FEELINGS OF UNSTEADINESS: 0

## 2024-04-30 ASSESSMENT — PATIENT HEALTH QUESTIONNAIRE - PHQ9
SUM OF ALL RESPONSES TO PHQ9 QUESTIONS 1 AND 2: 0
2. FEELING DOWN, DEPRESSED OR HOPELESS: NOT AT ALL
1. LITTLE INTEREST OR PLEASURE IN DOING THINGS: NOT AT ALL

## 2024-04-30 ASSESSMENT — COLUMBIA-SUICIDE SEVERITY RATING SCALE - C-SSRS
2. HAVE YOU ACTUALLY HAD ANY THOUGHTS OF KILLING YOURSELF?: NO
6. HAVE YOU EVER DONE ANYTHING, STARTED TO DO ANYTHING, OR PREPARED TO DO ANYTHING TO END YOUR LIFE?: NO
1. IN THE PAST MONTH, HAVE YOU WISHED YOU WERE DEAD OR WISHED YOU COULD GO TO SLEEP AND NOT WAKE UP?: NO

## 2024-04-30 NOTE — PROGRESS NOTES
"Subjective   Patient ID: Bere Mota is a 83 y.o. female who presents for concerns regarding blood pressure.      HPI Patient has been having issues with controlling blood pressure.  She was seen on 2/29/2024 by her cardiologist and was in the ER 2 weeks prior to that with elevated blood pressure.  At that time her telmisartan was increased to 1 tablet twice a day.  She was started on hydrochlorothiazide 12.5 mg daily but only took the medication for about 5 days because it caused her severe leg cramps.  She followed up with cardiology on 3/21/2024 and hydrochlorothiazide was discontinued.  She was recommended to continue telmisartan and metoprolol at that time.  Blood pressure is 124/78 at that visit.  Patient followed up on 4/9/2024 and she stated her insurance declined telmisartan and she was started on valsartan 80 mg daily.  She states that she is getting headaches or head pressure with the valsartan hand.  Denies any chest pain or shortness of breath.  No fevers or chills.  Blood pressures in the morning she states her normal but then in the afternoon though go as high as 140s 150s systolic.    Review of Systems   Constitutional:  Negative for chills and fever.   Eyes:  Negative for visual disturbance.   Respiratory:  Negative for cough and shortness of breath.    Cardiovascular:  Negative for chest pain, palpitations and leg swelling.   Gastrointestinal:  Negative for abdominal pain.   Neurological:  Positive for dizziness and headaches. Negative for syncope.   Psychiatric/Behavioral:  Negative for confusion.        Objective   /81   Pulse 70   Temp 36.7 °C (98 °F) (Temporal)   Ht 1.549 m (5' 1\")   Wt 72.6 kg (160 lb)   BMI 30.23 kg/m²     Physical Exam  Vitals and nursing note reviewed.   Constitutional:       General: She is not in acute distress.     Appearance: Normal appearance. She is obese. She is not ill-appearing, toxic-appearing or diaphoretic.   HENT:      Head: Normocephalic and " atraumatic.   Eyes:      Pupils: Pupils are equal, round, and reactive to light.   Cardiovascular:      Rate and Rhythm: Normal rate and regular rhythm.      Heart sounds: Normal heart sounds.   Pulmonary:      Effort: Pulmonary effort is normal. No respiratory distress.      Breath sounds: Normal breath sounds. No wheezing or rales.   Musculoskeletal:      Cervical back: Neck supple.      Right lower leg: No edema.      Left lower leg: No edema.   Skin:     General: Skin is warm and dry.   Neurological:      General: No focal deficit present.      Mental Status: She is alert and oriented to person, place, and time.      Cranial Nerves: No cranial nerve deficit.      Motor: No weakness.      Gait: Gait normal.   Psychiatric:         Mood and Affect: Mood normal.         Behavior: Behavior normal.         Thought Content: Thought content normal.         Judgment: Judgment normal.         Assessment/Plan   Problem List Items Addressed This Visit             ICD-10-CM    Benign essential hypertension - Primary I10    Relevant Medications    losartan (Cozaar) 25 mg tablet    H/O heart artery stent Z95.5     Hypertension: Chronic, uncontrolled we have discontinued valsartan and due to intolerance it is causing her head pressure when she takes the medication.  She did not tolerate hydrochlorothiazide due to leg cramps.  She will remain on metoprolol was started on losartan 25 mg daily if blood pressure still remains greater than 140/90 after 1 week she can take the medication twice daily.  Keep follow-up appointment with cardiology.  Schedule annual wellness visit.

## 2024-05-07 ENCOUNTER — OFFICE VISIT (OUTPATIENT)
Dept: CARDIOLOGY | Facility: CLINIC | Age: 84
End: 2024-05-07
Payer: MEDICARE

## 2024-05-07 VITALS
WEIGHT: 160 LBS | TEMPERATURE: 97.5 F | BODY MASS INDEX: 31.41 KG/M2 | HEIGHT: 60 IN | HEART RATE: 68 BPM | SYSTOLIC BLOOD PRESSURE: 124 MMHG | DIASTOLIC BLOOD PRESSURE: 80 MMHG

## 2024-05-07 DIAGNOSIS — I10 BENIGN ESSENTIAL HYPERTENSION: ICD-10-CM

## 2024-05-07 DIAGNOSIS — Z01.810 PREOP CARDIOVASCULAR EXAM: ICD-10-CM

## 2024-05-07 DIAGNOSIS — I25.10 ATHEROSCLEROSIS OF CORONARY ARTERY OF NATIVE HEART WITHOUT ANGINA PECTORIS, UNSPECIFIED VESSEL OR LESION TYPE: Primary | ICD-10-CM

## 2024-05-07 DIAGNOSIS — E78.2 MIXED HYPERLIPIDEMIA: ICD-10-CM

## 2024-05-07 DIAGNOSIS — Z95.5 H/O HEART ARTERY STENT: ICD-10-CM

## 2024-05-07 PROCEDURE — 1126F AMNT PAIN NOTED NONE PRSNT: CPT | Performed by: INTERNAL MEDICINE

## 2024-05-07 PROCEDURE — 99214 OFFICE O/P EST MOD 30 MIN: CPT | Performed by: INTERNAL MEDICINE

## 2024-05-07 PROCEDURE — 1160F RVW MEDS BY RX/DR IN RCRD: CPT | Performed by: INTERNAL MEDICINE

## 2024-05-07 PROCEDURE — 1159F MED LIST DOCD IN RCRD: CPT | Performed by: INTERNAL MEDICINE

## 2024-05-07 PROCEDURE — 3074F SYST BP LT 130 MM HG: CPT | Performed by: INTERNAL MEDICINE

## 2024-05-07 PROCEDURE — 1036F TOBACCO NON-USER: CPT | Performed by: INTERNAL MEDICINE

## 2024-05-07 PROCEDURE — 3079F DIAST BP 80-89 MM HG: CPT | Performed by: INTERNAL MEDICINE

## 2024-05-07 ASSESSMENT — PAIN SCALES - GENERAL: PAINLEVEL: 0-NO PAIN

## 2024-05-07 NOTE — PROGRESS NOTES
1. CAD NSTEMI, May 2023 circumflex stenting  2. Hypertension  3. Hyperlipidemia  4. Overweight BMI 30.1  5. noncompliant with medical advice    Patient is a pleasant 83-year-old female with the above-noted pertinent past medical history who presents today for follow-up of her hypertension.  Recently Dr. Dow and I discussed her case and multiple intolerance to medication further complicated by noncompliance patient was started on losartan 25 mg once daily.  She presents today with no specific complaints she states that she has been tolerating the losartan well with no significant side effects, she has no complaints of orthopnea PND palpitation or lower extremity edema.    BMI 31.2, blood pressure 124/80 mmHg and heart rate of 68 bpm  Patient is a well-developed well-nourished female in no acute distress speaking full sentences no carotid bruits upstroke and volumes are normal heart rate and rhythm are regular S1 and S2 are normal intensity no gallop murmurs or rubs appreciated lungs clear to auscultation abdomen is obese positive bowel sounds soft and nontender    4/2024 Lexiscan myocardial perfusion imaging  LVEF 65% and no evidence of stress ischemia or prior myocardial infarction    Coronary artery disease non-ST segment elevation myocardial infarction in May 2023 at which time required circumflex stenting, since patient has been managed with risk factor modifications, today patient with no evidence of ischemia  Preoperative evaluation for hip replacement patient recently completed a Lexiscan nuclear test with no evidence of ischemia or prior myocardial infarction LVEF was noted to be normal 65% patient is at low perioperative cardiovascular risk.  Blood pressure today is under good control on losartan 25 mg daily the importance secondary ways of decreasing blood pressure including low-sodium diet and weight loss was discussed and recommended  Patient is to return to my clinic in 6 months for follow-up.

## 2024-05-09 ENCOUNTER — TELEPHONE (OUTPATIENT)
Dept: PRIMARY CARE | Facility: CLINIC | Age: 84
End: 2024-05-09
Payer: MEDICARE

## 2024-05-22 DIAGNOSIS — Q24.5 CORONARY ARTERY ABNORMALITY (HHS-HCC): ICD-10-CM

## 2024-05-27 RX ORDER — EZETIMIBE 10 MG/1
10 TABLET ORAL DAILY
Qty: 100 TABLET | Refills: 2 | Status: SHIPPED | OUTPATIENT
Start: 2024-05-27

## 2024-05-27 RX ORDER — CLOPIDOGREL BISULFATE 75 MG/1
75 TABLET ORAL DAILY
Qty: 100 TABLET | Refills: 2 | Status: SHIPPED | OUTPATIENT
Start: 2024-05-27

## 2024-06-05 ENCOUNTER — TELEPHONE (OUTPATIENT)
Dept: CARDIOLOGY | Facility: CLINIC | Age: 84
End: 2024-06-05
Payer: MEDICARE

## 2024-06-06 ENCOUNTER — TELEPHONE (OUTPATIENT)
Dept: PRIMARY CARE | Facility: CLINIC | Age: 84
End: 2024-06-06
Payer: MEDICARE

## 2024-06-06 DIAGNOSIS — I10 BENIGN ESSENTIAL HYPERTENSION: ICD-10-CM

## 2024-06-06 NOTE — TELEPHONE ENCOUNTER
Taking losartan BID and will need refill soon to reflect she is now taking it BID    States zetia was raising bp so she stopped taking FYI

## 2024-06-07 RX ORDER — LOSARTAN POTASSIUM 25 MG/1
25 TABLET ORAL 2 TIMES DAILY
Qty: 180 TABLET | Refills: 3 | Status: SHIPPED | OUTPATIENT
Start: 2024-06-07 | End: 2025-06-07

## 2024-10-23 ENCOUNTER — TELEPHONE (OUTPATIENT)
Dept: PRIMARY CARE | Facility: CLINIC | Age: 84
End: 2024-10-23
Payer: MEDICARE

## 2024-10-23 DIAGNOSIS — E78.2 MIXED HYPERLIPIDEMIA: ICD-10-CM

## 2024-10-23 DIAGNOSIS — E55.9 VITAMIN D DEFICIENCY: ICD-10-CM

## 2024-10-23 DIAGNOSIS — Z95.5 H/O HEART ARTERY STENT: Primary | ICD-10-CM

## 2024-10-23 DIAGNOSIS — I10 BENIGN ESSENTIAL HYPERTENSION: ICD-10-CM

## 2024-10-23 NOTE — TELEPHONE ENCOUNTER
Patient is asking for orders for blood work  to be before her 11/7/2024 appt.  She will  the orders.  Please call when ready

## 2024-10-29 LAB
NON-UH HIE A/G RATIO: 1.5
NON-UH HIE ALB: 4 G/DL (ref 3.4–5)
NON-UH HIE ALK PHOS: 80 UNIT/L (ref 45–117)
NON-UH HIE BASO COUNT: 0.04 X1000 (ref 0–0.2)
NON-UH HIE BASOS %: 0.6 %
NON-UH HIE BILIRUBIN, TOTAL: 0.4 MG/DL (ref 0.3–1.2)
NON-UH HIE BUN/CREAT RATIO: 27.3
NON-UH HIE BUN: 30 MG/DL (ref 9–23)
NON-UH HIE CALCIUM: 10.1 MG/DL (ref 8.7–10.4)
NON-UH HIE CALCULATED LDL CHOLESTEROL: 202 MG/DL (ref 60–130)
NON-UH HIE CALCULATED OSMOLALITY: 293 MOSM/KG (ref 275–295)
NON-UH HIE CHLORIDE: 111 MMOL/L (ref 98–107)
NON-UH HIE CHOLESTEROL: 302 MG/DL (ref 100–200)
NON-UH HIE CO2, VENOUS: 26 MMOL/L (ref 20–31)
NON-UH HIE CREATININE: 1.1 MG/DL (ref 0.5–0.8)
NON-UH HIE DIFF?: NO
NON-UH HIE EOS COUNT: 0.23 X1000 (ref 0–0.5)
NON-UH HIE EOSIN %: 3.3 %
NON-UH HIE GFR AA: 57
NON-UH HIE GLOBULIN: 2.6 G/DL
NON-UH HIE GLOMERULAR FILTRATION RATE: 47 ML/MIN/1.73M?
NON-UH HIE GLUCOSE: 94 MG/DL (ref 74–106)
NON-UH HIE GOT: 22 UNIT/L (ref 15–37)
NON-UH HIE GPT: 23 UNIT/L (ref 10–49)
NON-UH HIE HCT: 40.3 % (ref 36–46)
NON-UH HIE HDL CHOLESTEROL: 53 MG/DL (ref 40–60)
NON-UH HIE HGB: 13.2 G/DL (ref 12–16)
NON-UH HIE INSTR WBC: 6.9
NON-UH HIE K: 4.4 MMOL/L (ref 3.5–5.1)
NON-UH HIE LYMPH %: 29.3 %
NON-UH HIE LYMPH COUNT: 2.02 X1000 (ref 1.2–4.8)
NON-UH HIE MCH: 30.3 PG (ref 27–34)
NON-UH HIE MCHC: 32.8 G/DL (ref 32–37)
NON-UH HIE MCV: 92.4 FL (ref 80–100)
NON-UH HIE MONO %: 8.4 %
NON-UH HIE MONO COUNT: 0.58 X1000 (ref 0.1–1)
NON-UH HIE MPV: 8.8 FL (ref 7.4–10.4)
NON-UH HIE NA: 144 MMOL/L (ref 135–145)
NON-UH HIE NEUTROPHIL %: 58.4 %
NON-UH HIE NEUTROPHIL COUNT (ANC): 4.03 X1000 (ref 1.4–8.8)
NON-UH HIE NUCLEATED RBC: 0 /100WBC
NON-UH HIE PLATELET: 218 X10 (ref 150–450)
NON-UH HIE RBC: 4.36 X10 (ref 4.2–5.4)
NON-UH HIE RDW: 13.3 % (ref 11.5–14.5)
NON-UH HIE TOTAL CHOL/HDL CHOL RATIO: 5.7
NON-UH HIE TOTAL PROTEIN: 6.6 G/DL (ref 5.7–8.2)
NON-UH HIE TRIGLYCERIDES: 237 MG/DL (ref 30–150)
NON-UH HIE VIT D 25: 48 NG/ML
NON-UH HIE WBC: 6.9 X10 (ref 4.5–11)

## 2024-11-05 ENCOUNTER — APPOINTMENT (OUTPATIENT)
Dept: PRIMARY CARE | Facility: CLINIC | Age: 84
End: 2024-11-05
Payer: MEDICARE

## 2024-11-05 ENCOUNTER — APPOINTMENT (OUTPATIENT)
Dept: CARDIOLOGY | Facility: CLINIC | Age: 84
End: 2024-11-05
Payer: MEDICARE

## 2024-11-05 VITALS
BODY MASS INDEX: 32.51 KG/M2 | HEART RATE: 74 BPM | WEIGHT: 165.6 LBS | TEMPERATURE: 97.9 F | HEIGHT: 60 IN | DIASTOLIC BLOOD PRESSURE: 77 MMHG | SYSTOLIC BLOOD PRESSURE: 146 MMHG

## 2024-11-05 DIAGNOSIS — I10 PRIMARY HYPERTENSION: ICD-10-CM

## 2024-11-05 DIAGNOSIS — Z95.5 H/O HEART ARTERY STENT: ICD-10-CM

## 2024-11-05 DIAGNOSIS — N18.31 STAGE 3A CHRONIC KIDNEY DISEASE (MULTI): ICD-10-CM

## 2024-11-05 DIAGNOSIS — Z85.038 HISTORY OF COLON CANCER: ICD-10-CM

## 2024-11-05 DIAGNOSIS — Z13.29 THYROID DISORDER SCREENING: ICD-10-CM

## 2024-11-05 DIAGNOSIS — R73.09 IMPAIRED GLUCOSE METABOLISM: ICD-10-CM

## 2024-11-05 DIAGNOSIS — E55.9 VITAMIN D DEFICIENCY: ICD-10-CM

## 2024-11-05 DIAGNOSIS — I10 BENIGN ESSENTIAL HYPERTENSION: Primary | ICD-10-CM

## 2024-11-05 DIAGNOSIS — E78.2 MIXED HYPERLIPIDEMIA: ICD-10-CM

## 2024-11-05 PROCEDURE — 3078F DIAST BP <80 MM HG: CPT | Performed by: INTERNAL MEDICINE

## 2024-11-05 PROCEDURE — 1159F MED LIST DOCD IN RCRD: CPT | Performed by: INTERNAL MEDICINE

## 2024-11-05 PROCEDURE — 1160F RVW MEDS BY RX/DR IN RCRD: CPT | Performed by: INTERNAL MEDICINE

## 2024-11-05 PROCEDURE — 1036F TOBACCO NON-USER: CPT | Performed by: INTERNAL MEDICINE

## 2024-11-05 PROCEDURE — 1123F ACP DISCUSS/DSCN MKR DOCD: CPT | Performed by: INTERNAL MEDICINE

## 2024-11-05 PROCEDURE — 1158F ADVNC CARE PLAN TLK DOCD: CPT | Performed by: INTERNAL MEDICINE

## 2024-11-05 PROCEDURE — 99213 OFFICE O/P EST LOW 20 MIN: CPT | Performed by: INTERNAL MEDICINE

## 2024-11-05 PROCEDURE — 3077F SYST BP >= 140 MM HG: CPT | Performed by: INTERNAL MEDICINE

## 2024-11-05 RX ORDER — METOPROLOL SUCCINATE 25 MG/1
25 TABLET, EXTENDED RELEASE ORAL DAILY
Qty: 100 TABLET | Refills: 3 | Status: SHIPPED | OUTPATIENT
Start: 2024-11-05

## 2024-11-05 RX ORDER — LOSARTAN POTASSIUM 25 MG/1
25 TABLET ORAL 2 TIMES DAILY
Qty: 180 TABLET | Refills: 3 | Status: SHIPPED | OUTPATIENT
Start: 2024-11-05 | End: 2025-11-05

## 2024-11-05 ASSESSMENT — ENCOUNTER SYMPTOMS
HALLUCINATIONS: 0
BLOOD IN STOOL: 0
AGITATION: 0
CONSTIPATION: 0
LIGHT-HEADEDNESS: 0
CHILLS: 0
DIZZINESS: 0
DIARRHEA: 0
SORE THROAT: 0
FEVER: 0
ABDOMINAL PAIN: 0
COUGH: 0
PALPITATIONS: 0
VOMITING: 0
NAUSEA: 0
SHORTNESS OF BREATH: 0

## 2024-11-05 ASSESSMENT — PATIENT HEALTH QUESTIONNAIRE - PHQ9
2. FEELING DOWN, DEPRESSED OR HOPELESS: NOT AT ALL
1. LITTLE INTEREST OR PLEASURE IN DOING THINGS: NOT AT ALL
SUM OF ALL RESPONSES TO PHQ9 QUESTIONS 1 AND 2: 0

## 2024-11-05 NOTE — PROGRESS NOTES
Subjective   Patient ID: Bere Mota is a 84 y.o. female who presents for Follow-up and Results (Lab review.//No other questions or concerns).    HPI  Patient's blood pressure's at home are 125-130 systolic.  Patient is on losartan and metoprolol.  She takes losartan twice daily.  I am hesitant to adjust her medications further as she had significant issues with dizziness with blood pressure medications in the past.  It appears she is tolerating the current medications well.  He is inquiring about getting off of Plavix.  I instructed the patient she will need to discuss this with her cardiologist first.  Patient also has been intolerant to statins and now also Zetia causing myalgia.  I recommend Repatha or other PS CS K9 inhibitor.  He reviewed the patient's labs with her in detail today.  She has significant LDL level of 202 vitamin D CBC were normal.  Her creatinine was 1.1 and BUN of 30.  Denies any fever, chills, chest pain or shortness of breath, nausea or vomiting.  She is due for bone density screening which she defers at this time    Review of Systems   Constitutional:  Negative for chills and fever.   HENT:  Negative for sore throat.    Eyes:  Negative for visual disturbance.   Respiratory:  Negative for cough and shortness of breath.    Cardiovascular:  Negative for chest pain, palpitations and leg swelling.   Gastrointestinal:  Negative for abdominal pain, blood in stool, constipation, diarrhea, nausea and vomiting.   Skin:  Negative for rash.   Neurological:  Negative for dizziness, syncope and light-headedness.   Psychiatric/Behavioral:  Negative for agitation and hallucinations.        Objective   /77 (BP Location: Left arm, Patient Position: Sitting, BP Cuff Size: Adult)   Pulse 74   Temp 36.6 °C (97.9 °F)   Ht 1.524 m (5')   Wt 75.1 kg (165 lb 9.6 oz)   BMI 32.34 kg/m²     Physical Exam  Vitals and nursing note reviewed.   Constitutional:       General: She is not in acute distress.      Appearance: Normal appearance. She is obese. She is not ill-appearing, toxic-appearing or diaphoretic.   HENT:      Head: Normocephalic and atraumatic.      Mouth/Throat:      Mouth: Mucous membranes are moist.      Pharynx: Oropharynx is clear. No oropharyngeal exudate.   Eyes:      Pupils: Pupils are equal, round, and reactive to light.   Cardiovascular:      Rate and Rhythm: Normal rate and regular rhythm.   Pulmonary:      Effort: Pulmonary effort is normal. No respiratory distress.      Breath sounds: Normal breath sounds. No wheezing, rhonchi or rales.   Abdominal:      General: Bowel sounds are normal. There is no distension.      Palpations: Abdomen is soft.      Tenderness: There is no abdominal tenderness.   Musculoskeletal:      Cervical back: Neck supple.      Right lower leg: No edema.      Left lower leg: No edema.   Lymphadenopathy:      Cervical: No cervical adenopathy.   Skin:     General: Skin is warm and dry.      Coloration: Skin is not jaundiced or pale.      Findings: Rash (dry irritation skin to the left upper back) present.   Neurological:      General: No focal deficit present.      Mental Status: She is alert.      Cranial Nerves: No cranial nerve deficit.   Psychiatric:         Mood and Affect: Mood normal.         Behavior: Behavior normal.         Thought Content: Thought content normal.         Judgment: Judgment normal.         Assessment/Plan   Problem List Items Addressed This Visit             ICD-10-CM    Benign essential hypertension - Primary I10    Relevant Medications    losartan (Cozaar) 25 mg tablet    metoprolol succinate XL (Toprol-XL) 25 mg 24 hr tablet    H/O heart artery stent Z95.5    Hyperlipidemia E78.5    Relevant Orders    CBC and Auto Differential    Comprehensive metabolic panel    Lipid panel    Impaired glucose metabolism R73.09    Vitamin D deficiency E55.9    Relevant Orders    Vitamin D 25-Hydroxy,Total (for eval of Vitamin D levels)    History of colon  cancer Z85.038    Stage 3a chronic kidney disease (Multi) N18.31    Primary hypertension I10    Relevant Orders    CBC and Auto Differential    Comprehensive metabolic panel    Thyroid disorder screening Z13.29    Relevant Orders    TSH with reflex to Free T4 if abnormal     Hypertension: Chronic, stable continue losartan and metoprolol.    History of heart stent: She will discuss with cardiology continuing Plavix    Hyperlipidemia: I recommended a newer medication such as Repatha or other PSC K9 inhibitors which she will also discuss with cardiology at her upcoming visit    Appear glucose metabolism: Chronic, stable    Stage IIIa chronic kidney disease: Chronic, stable avoid nephrotoxic agents or NSAIDs    History of colon cancer: She follows with hematology oncology at [facility] states she has upcoming surveillance scan planned

## 2024-11-06 PROBLEM — Z78.9 STATIN INTOLERANCE: Status: ACTIVE | Noted: 2024-11-06

## 2024-11-06 PROBLEM — I24.0 RCA OCCLUSION (MULTI): Status: ACTIVE | Noted: 2024-11-06

## 2024-11-07 ENCOUNTER — APPOINTMENT (OUTPATIENT)
Dept: CARDIOLOGY | Facility: CLINIC | Age: 84
End: 2024-11-07
Payer: MEDICARE

## 2024-11-07 VITALS
BODY MASS INDEX: 32.59 KG/M2 | WEIGHT: 166 LBS | HEART RATE: 84 BPM | HEIGHT: 60 IN | TEMPERATURE: 98 F | DIASTOLIC BLOOD PRESSURE: 76 MMHG | SYSTOLIC BLOOD PRESSURE: 140 MMHG

## 2024-11-07 DIAGNOSIS — Q24.5 CORONARY ARTERY ABNORMALITY (HHS-HCC): ICD-10-CM

## 2024-11-07 DIAGNOSIS — Z95.5 PRESENCE OF DRUG COATED STENT IN LEFT CIRCUMFLEX CORONARY ARTERY: ICD-10-CM

## 2024-11-07 DIAGNOSIS — I25.10 ATHEROSCLEROSIS OF NATIVE CORONARY ARTERY OF NATIVE HEART WITHOUT ANGINA PECTORIS: ICD-10-CM

## 2024-11-07 DIAGNOSIS — I24.0 RCA OCCLUSION (MULTI): ICD-10-CM

## 2024-11-07 DIAGNOSIS — E78.00 ELEVATED LDL CHOLESTEROL LEVEL: ICD-10-CM

## 2024-11-07 DIAGNOSIS — E78.2 MIXED HYPERLIPIDEMIA: ICD-10-CM

## 2024-11-07 DIAGNOSIS — I10 BENIGN ESSENTIAL HYPERTENSION: Primary | ICD-10-CM

## 2024-11-07 DIAGNOSIS — Z78.9 STATIN INTOLERANCE: ICD-10-CM

## 2024-11-07 DIAGNOSIS — I10 PRIMARY HYPERTENSION: ICD-10-CM

## 2024-11-07 PROBLEM — I20.0 ANGINA PECTORIS, UNSTABLE (MULTI): Status: RESOLVED | Noted: 2023-10-19 | Resolved: 2024-11-07

## 2024-11-07 PROCEDURE — 1036F TOBACCO NON-USER: CPT | Performed by: INTERNAL MEDICINE

## 2024-11-07 PROCEDURE — 99214 OFFICE O/P EST MOD 30 MIN: CPT | Performed by: INTERNAL MEDICINE

## 2024-11-07 PROCEDURE — 3077F SYST BP >= 140 MM HG: CPT | Performed by: INTERNAL MEDICINE

## 2024-11-07 PROCEDURE — G2211 COMPLEX E/M VISIT ADD ON: HCPCS | Performed by: INTERNAL MEDICINE

## 2024-11-07 PROCEDURE — 3078F DIAST BP <80 MM HG: CPT | Performed by: INTERNAL MEDICINE

## 2024-11-07 PROCEDURE — 1160F RVW MEDS BY RX/DR IN RCRD: CPT | Performed by: INTERNAL MEDICINE

## 2024-11-07 PROCEDURE — 1123F ACP DISCUSS/DSCN MKR DOCD: CPT | Performed by: INTERNAL MEDICINE

## 2024-11-07 PROCEDURE — 1159F MED LIST DOCD IN RCRD: CPT | Performed by: INTERNAL MEDICINE

## 2024-11-07 RX ORDER — NAPROXEN SODIUM 220 MG/1
81 TABLET, FILM COATED ORAL DAILY
Qty: 90 TABLET | Refills: 3 | Status: SHIPPED | OUTPATIENT
Start: 2024-11-07 | End: 2025-11-07

## 2024-11-07 ASSESSMENT — ENCOUNTER SYMPTOMS
PALPITATIONS: 0
BACK PAIN: 1
ACTIVITY CHANGE: 1
SHORTNESS OF BREATH: 1
NERVOUS/ANXIOUS: 1
ARTHRALGIAS: 1
WEAKNESS: 1
FATIGUE: 1
ABDOMINAL DISTENTION: 1

## 2024-11-07 NOTE — PROGRESS NOTES
Patient:  Bere Mota  YOB: 1940  MRN: [id number]       Chief Complaint/Active Symptoms:       Bere Mota is a 84 y.o. female who returns today for cardiac follow-up.    Here for elective follow-up. No chest pain or discomfort. Has some SOB on exertion since hip injury since she has been very sedentary. No orthopnea or PND. No edema. No palpitations. No syncope or near syncope.     Discussed her statin intolerance. Had myalgias and abdominal pain with 4 separate statins and is unwilling to try again. Had myalgias with Zetia but no abdominal pain.     Review of Systems   Constitutional:  Positive for activity change and fatigue.   HENT:  Positive for congestion.    Respiratory:  Positive for shortness of breath.    Cardiovascular:  Positive for leg swelling. Negative for chest pain and palpitations.   Gastrointestinal:  Positive for abdominal distention.   Genitourinary: Negative.    Musculoskeletal:  Positive for arthralgias, back pain and gait problem.   Neurological:  Positive for weakness.   Psychiatric/Behavioral:  The patient is nervous/anxious.    All other systems reviewed and are negative.      Objective:     Vitals:    11/07/24 1109   BP: 158/78   Pulse: 84   Temp: 36.7 °C (98 °F)       Vitals:    11/07/24 1109   Weight: 75.3 kg (166 lb)       Allergies:     Allergies   Allergen Reactions    Ciprofloxacin Unknown, Anaphylaxis and Other     pain in throat and ears    Penicillins Unknown and Swelling    Statins-Hmg-Coa Reductase Inhibitors Unknown    Zetia [Ezetimibe] Myalgia          Medications:     Current Outpatient Medications   Medication Instructions    cholecalciferol (VITAMIN D-3) 4,000 Units, Daily RT    clopidogrel (PLAVIX) 75 mg, oral, Daily    cyanocobalamin (Vitamin B-12) 1,000 mcg tablet 1 tablet, Daily    folic acid/multivit-min/lutein (CENTRUM SILVER ORAL) 1 tablet, Daily    losartan (COZAAR) 25 mg, oral, 2 times daily    metoprolol succinate XL (TOPROL-XL) 25 mg, oral, Daily     "nitroglycerin (NITROSTAT) 0.4 mg, Every 5 min PRN    zinc gluconate 50 mg tablet 50 mg of elemental zinc, Daily       Physical Examination:   GENERAL:  Well developed, well nourished, in no acute distress.  HEENT: NC AT, EOMI with anicteric sclera  NECK:  Supple, no JVD, no bruit.  CHEST:  Symmetric and nontender.  LUNGS:  Clear to auscultation bilaterally, normal respiratory effort.  HEART:  PMI is nondisplaced. RRR with normal S1 and S2, no S3, no mumur or rub. No carotid or abdominal bruits  ABDOMEN: Soft, NT, ND without palpable organomegaly or bruits  EXTREMITIES:  Warm with good color, no clubbing or cyanosis.  There is no edema noted.  PERIPHERAL VASCULAR:  Pulses present and equally palpable.  MUSCULOSKELETAL: OA changes  NEURO/PSYCH:  Alert and oriented times three with approppriate behavior and responses. Nonfocal motor examination   Skin: no rash or lesions on exposed skin or reported.    Lab:     CBC:   No results found for: \"WBC\", \"RBC\", \"HGB\", \"HCT\", \"PLT\"     CMP:    Lab Results   Component Value Date     04/16/2021    K 4.4 04/16/2021     04/16/2021    CO2 26 04/16/2021    BUN 29 (H) 04/16/2021    CREATININE 1.07 (H) 04/16/2021    GLUCOSE 106 (H) 04/16/2021    CALCIUM 9.7 04/16/2021       Magnesium:    No results found for: \"MG\"    Lipid Profile:    Lab Results   Component Value Date    TRIG 199 (H) 04/16/2021    HDL 51.0 04/16/2021       TSH:    No results found for: \"TSH\"    BNP:   No results found for: \"BNP\"     PT/INR:    No results found for: \"PROTIME\", \"INR\"    HgBA1c:    No results found for: \"HGBA1C\"    BMP:  Lab Results   Component Value Date     04/16/2021     11/04/2020     07/02/2020    K 4.4 04/16/2021    K 4.6 11/04/2020    K 4.4 07/02/2020     04/16/2021     (H) 11/04/2020     07/02/2020    CO2 26 04/16/2021    CO2 25 11/04/2020    CO2 25 07/02/2020    BUN 29 (H) 04/16/2021    BUN 31 (H) 11/04/2020    BUN 23 07/02/2020    CREATININE 1.07 " "(H) 04/16/2021    CREATININE 1.10 (H) 11/04/2020    CREATININE 1.03 07/02/2020       Cardiac Enzymes:    No results found for: \"TROPHS\"    Hepatic Function Panel:    Lab Results   Component Value Date    ALKPHOS 69 11/04/2020    ALT 16 11/04/2020    AST 19 11/04/2020    PROT 7.0 11/04/2020    BILITOT 0.5 11/04/2020         Diagnostic Studies:     No recent testing.   EKG with stress test 4/2024. Stress test low risk    Radiology:     No orders to display     ASSESSMENT     Problem List Items Addressed This Visit       Atherosclerosis of coronary artery of native heart without angina pectoris    Relevant Medications    evolocumab (Repatha SureClick) 140 mg/mL injection    aspirin 81 mg chewable tablet    Other Relevant Orders    Follow Up In Cardiology    Benign essential hypertension - Primary    Relevant Orders    Follow Up In Cardiology    Presence of drug coated stent in left circumflex coronary artery    Hyperlipidemia    Primary hypertension    RCA occlusion (Multi)    Statin intolerance    Relevant Medications    evolocumab (Repatha SureClick) 140 mg/mL injection     Other Visit Diagnoses       Coronary artery abnormality ([facility]HS-HCC)        Elevated LDL cholesterol level        Relevant Medications    evolocumab (Repatha SureClick) 140 mg/mL injection    Other Relevant Orders    Follow Up In Cardiology            PLAN     1.  Coronary artery disease stable without angina pectoris.  History of distal left circumflex stenting and RCA chronic total occlusion with collaterals.  Patient is a poor understanding of her cardiac condition.  She is unwilling to try a statin and is intolerant to all of the medications prescribed previously we have asked for her to try Repatha and our nurse will work on getting prior authorization for the medication.  Her LDL cholesterol remains markedly elevated so additional cardiovascular risk is important.  2.  Hypertension.  Initial blood pressure reading was elevated repeat blood " pressure reading shows better blood pressure control.  She always appears to have some borderline hypertension.  She is reluctant to change any other medications today but if she remains hypertensive I would increase the dose of her losartan.  3.  Elevated LDL cholesterol with statin intolerance.  Please see the above recommendation to start Repatha.    If once we start the Repatha therapy will check her transaminases and lipid panel about 2 months later.  Will see her in follow-up in 6 months if she remains asymptomatic.

## 2024-11-07 NOTE — PATIENT INSTRUCTIONS
Start low dose aspirin 81 mg daily - EVERY DAY  Once you start taking daily aspirin, you can stop your Clopidogrel / plavix.

## 2025-05-07 LAB
NON-UH HIE A/G RATIO: 1.4
NON-UH HIE ALB: 4.1 G/DL (ref 3.4–5)
NON-UH HIE ALK PHOS: 64 UNIT/L (ref 45–117)
NON-UH HIE BASO COUNT: 0.06 X1000 (ref 0–0.2)
NON-UH HIE BASOS %: 0.9 %
NON-UH HIE BILIRUBIN, TOTAL: 0.6 MG/DL (ref 0.3–1.2)
NON-UH HIE BUN/CREAT RATIO: 13.6
NON-UH HIE BUN: 15 MG/DL (ref 9–23)
NON-UH HIE CALCIUM: 10.2 MG/DL (ref 8.7–10.4)
NON-UH HIE CALCULATED LDL CHOLESTEROL: 192 MG/DL (ref 60–130)
NON-UH HIE CALCULATED OSMOLALITY: 282 MOSM/KG (ref 275–295)
NON-UH HIE CHLORIDE: 103 MMOL/L (ref 98–107)
NON-UH HIE CHOLESTEROL: 281 MG/DL (ref 100–200)
NON-UH HIE CO2, VENOUS: 25 MMOL/L (ref 20–31)
NON-UH HIE CREATININE: 1.1 MG/DL (ref 0.5–0.8)
NON-UH HIE DIFF?: NORMAL
NON-UH HIE EOS COUNT: 0.17 X1000 (ref 0–0.5)
NON-UH HIE EOSIN %: 2.5 %
NON-UH HIE GFR AA: 57
NON-UH HIE GLOBULIN: 2.9 G/DL
NON-UH HIE GLOMERULAR FILTRATION RATE: 47 ML/MIN/1.73M?
NON-UH HIE GLUCOSE: 101 MG/DL (ref 74–106)
NON-UH HIE GOT: 19 UNIT/L (ref 15–37)
NON-UH HIE GPT: 13 UNIT/L (ref 10–49)
NON-UH HIE HCT: 40.6 % (ref 36–46)
NON-UH HIE HDL CHOLESTEROL: 51 MG/DL (ref 40–60)
NON-UH HIE HGB: 13.5 G/DL (ref 12–16)
NON-UH HIE INSTR WBC: 6.7
NON-UH HIE K: 4.1 MMOL/L (ref 3.5–5.1)
NON-UH HIE LYMPH %: 31.4 %
NON-UH HIE LYMPH COUNT: 2.12 X1000 (ref 1.2–4.8)
NON-UH HIE MCH: 31 PG (ref 27–34)
NON-UH HIE MCHC: 33.3 G/DL (ref 32–37)
NON-UH HIE MCV: 93.1 FL (ref 80–100)
NON-UH HIE MONO %: 7.6 %
NON-UH HIE MONO COUNT: 0.51 X1000 (ref 0.1–1)
NON-UH HIE MPV: 9.5 FL (ref 7.4–10.4)
NON-UH HIE NA: 141 MMOL/L (ref 135–145)
NON-UH HIE NEUTROPHIL %: 57.7 %
NON-UH HIE NEUTROPHIL COUNT (ANC): 3.88 X1000 (ref 1.4–8.8)
NON-UH HIE NUCLEATED RBC: 0 /100WBC
NON-UH HIE PLATELET: 225 X10 (ref 150–450)
NON-UH HIE RBC: 4.37 X10 (ref 4.2–5.4)
NON-UH HIE RDW: 13.1 % (ref 11.5–14.5)
NON-UH HIE TOTAL CHOL/HDL CHOL RATIO: 5.5
NON-UH HIE TOTAL PROTEIN: 7 G/DL (ref 5.7–8.2)
NON-UH HIE TRIGLYCERIDES: 192 MG/DL (ref 30–150)
NON-UH HIE TSH: 4.38 UIU/ML (ref 0.55–4.78)
NON-UH HIE VIT D 25: 66 NG/ML
NON-UH HIE WBC: 6.7 X10 (ref 4.5–11)

## 2025-05-15 ENCOUNTER — APPOINTMENT (OUTPATIENT)
Dept: CARDIOLOGY | Facility: CLINIC | Age: 85
End: 2025-05-15
Payer: MEDICARE

## 2025-05-15 ENCOUNTER — APPOINTMENT (OUTPATIENT)
Dept: PRIMARY CARE | Facility: CLINIC | Age: 85
End: 2025-05-15
Payer: MEDICARE

## 2025-05-15 VITALS
HEIGHT: 60 IN | TEMPERATURE: 97 F | HEART RATE: 76 BPM | SYSTOLIC BLOOD PRESSURE: 128 MMHG | DIASTOLIC BLOOD PRESSURE: 78 MMHG | WEIGHT: 149 LBS | BODY MASS INDEX: 29.25 KG/M2

## 2025-05-15 VITALS
DIASTOLIC BLOOD PRESSURE: 84 MMHG | SYSTOLIC BLOOD PRESSURE: 176 MMHG | HEART RATE: 76 BPM | WEIGHT: 149.6 LBS | TEMPERATURE: 97 F | HEIGHT: 61 IN | BODY MASS INDEX: 28.25 KG/M2

## 2025-05-15 DIAGNOSIS — I25.10 ATHEROSCLEROSIS OF NATIVE CORONARY ARTERY OF NATIVE HEART WITHOUT ANGINA PECTORIS: Primary | ICD-10-CM

## 2025-05-15 DIAGNOSIS — Z23 ENCOUNTER FOR IMMUNIZATION: ICD-10-CM

## 2025-05-15 DIAGNOSIS — I10 BENIGN ESSENTIAL HYPERTENSION: ICD-10-CM

## 2025-05-15 DIAGNOSIS — Z00.00 HEALTH MAINTENANCE EXAMINATION: Primary | ICD-10-CM

## 2025-05-15 DIAGNOSIS — I24.0 RCA OCCLUSION (MULTI): ICD-10-CM

## 2025-05-15 DIAGNOSIS — N18.31 STAGE 3A CHRONIC KIDNEY DISEASE (MULTI): ICD-10-CM

## 2025-05-15 DIAGNOSIS — E78.2 MIXED HYPERLIPIDEMIA: ICD-10-CM

## 2025-05-15 DIAGNOSIS — Z78.9 STATIN INTOLERANCE: ICD-10-CM

## 2025-05-15 DIAGNOSIS — C77.2 SECONDARY AND UNSPECIFIED MALIGNANT NEOPLASM OF INTRA-ABDOMINAL LYMPH NODES (MULTI): ICD-10-CM

## 2025-05-15 DIAGNOSIS — J44.9 CHRONIC OBSTRUCTIVE PULMONARY DISEASE, UNSPECIFIED COPD TYPE (MULTI): ICD-10-CM

## 2025-05-15 DIAGNOSIS — R73.09 IMPAIRED GLUCOSE METABOLISM: ICD-10-CM

## 2025-05-15 DIAGNOSIS — E55.9 VITAMIN D DEFICIENCY: ICD-10-CM

## 2025-05-15 DIAGNOSIS — Z78.0 MENOPAUSE PRESENT: ICD-10-CM

## 2025-05-15 DIAGNOSIS — Z85.038 HISTORY OF COLON CANCER: ICD-10-CM

## 2025-05-15 PROBLEM — R09.89 LABILE HYPERTENSION: Status: ACTIVE | Noted: 2025-05-15

## 2025-05-15 PROBLEM — E53.8 VITAMIN B12 DEFICIENCY: Status: ACTIVE | Noted: 2025-05-15

## 2025-05-15 PROCEDURE — 1159F MED LIST DOCD IN RCRD: CPT | Performed by: INTERNAL MEDICINE

## 2025-05-15 PROCEDURE — 99397 PER PM REEVAL EST PAT 65+ YR: CPT | Performed by: INTERNAL MEDICINE

## 2025-05-15 PROCEDURE — 1158F ADVNC CARE PLAN TLK DOCD: CPT | Performed by: INTERNAL MEDICINE

## 2025-05-15 PROCEDURE — 1160F RVW MEDS BY RX/DR IN RCRD: CPT | Performed by: INTERNAL MEDICINE

## 2025-05-15 PROCEDURE — 1036F TOBACCO NON-USER: CPT | Performed by: INTERNAL MEDICINE

## 2025-05-15 PROCEDURE — 3079F DIAST BP 80-89 MM HG: CPT | Performed by: INTERNAL MEDICINE

## 2025-05-15 PROCEDURE — 1123F ACP DISCUSS/DSCN MKR DOCD: CPT | Performed by: INTERNAL MEDICINE

## 2025-05-15 PROCEDURE — 1170F FXNL STATUS ASSESSED: CPT | Performed by: INTERNAL MEDICINE

## 2025-05-15 PROCEDURE — 90677 PCV20 VACCINE IM: CPT | Performed by: INTERNAL MEDICINE

## 2025-05-15 PROCEDURE — 3077F SYST BP >= 140 MM HG: CPT | Performed by: INTERNAL MEDICINE

## 2025-05-15 PROCEDURE — G0009 ADMIN PNEUMOCOCCAL VACCINE: HCPCS | Performed by: INTERNAL MEDICINE

## 2025-05-15 RX ORDER — MULTIVITAMIN/IRON/FOLIC ACID 18MG-0.4MG
1 TABLET ORAL EVERY OTHER DAY
COMMUNITY

## 2025-05-15 ASSESSMENT — ENCOUNTER SYMPTOMS
AGITATION: 0
DIZZINESS: 0
RESPIRATORY NEGATIVE: 1
VOMITING: 0
DYSURIA: 0
BLOOD IN STOOL: 0
CONFUSION: 0
COUGH: 0
DIARRHEA: 0
CHILLS: 0
NAUSEA: 0
ARTHRALGIAS: 1
CARDIOVASCULAR NEGATIVE: 1
SORE THROAT: 0
LIGHT-HEADEDNESS: 0
SHORTNESS OF BREATH: 0
PALPITATIONS: 0
FEVER: 0
ABDOMINAL PAIN: 0
DIZZINESS: 1
HEMATURIA: 0

## 2025-05-15 ASSESSMENT — ACTIVITIES OF DAILY LIVING (ADL)
TAKING_MEDICATION: INDEPENDENT
BATHING: INDEPENDENT
DRESSING: INDEPENDENT
DOING_HOUSEWORK: INDEPENDENT
GROCERY_SHOPPING: INDEPENDENT
MANAGING_FINANCES: INDEPENDENT

## 2025-05-15 ASSESSMENT — PATIENT HEALTH QUESTIONNAIRE - PHQ9
2. FEELING DOWN, DEPRESSED OR HOPELESS: NOT AT ALL
SUM OF ALL RESPONSES TO PHQ9 QUESTIONS 1 AND 2: 0
1. LITTLE INTEREST OR PLEASURE IN DOING THINGS: NOT AT ALL

## 2025-05-15 NOTE — PROGRESS NOTES
Subjective   Reason for Visit: Bere Mota is an 84 y.o. female here for a Medicare Wellness visit.     Past Medical, Surgical, and Family History reviewed and updated in chart.    Reviewed all medications by prescribing practitioner or clinical pharmacist (such as prescriptions, OTCs, herbal therapies and supplements) and documented in the medical record.    History of Present Illness  Bere Mota is an 84 year old female with hypertension and hyperlipidemia who presents for an annual wellness visit.    She experiences fluctuations in her blood pressure readings at home, ranging from 120 to 150 mmHg. She is currently taking losartan 25 mg twice daily but has experienced dizziness in the past, which led to the medication being split into two doses. Her blood pressure sometimes increases after taking her losartan medication, causing confusion about this pattern. No missed doses of medication are reported.  I did review her cardiologist office note from her last visit that stated to consider raising her losartan dose if her blood pressure remained elevated.  She has an appointment in a half an hour with her cardiologist in our office.    She has a history of hyperlipidemia and her cholesterol levels remain high despite weight loss. She is not taking the Repatha injection medication for cholesterol due to concerns about side effects and does not tolerate statins well. She is focusing on dietary changes, including reducing carbohydrate and fat intake, to manage her cholesterol levels. There is a family history of high cholesterol, particularly her father's similar condition.  Highly recommended patient take the Repatha medication that was prescribed by cardiology.  We discussed the importance of this.  Gave patient reassurance that people are tolerating this medication well with minimal side effects.    She has recently lost weight, going from 165 pounds to 149 pounds, attributed to dietary changes. She is aiming to lose  "more weight and is actively working on it.    She is not on any medication for anxiety and manages stress through meditation. No history of gout. She takes vitamin B supplements every other day due to her high dosage.    She is a new  and her  used to do all the driving    No pain, leg swelling, or abdominal issues.    Patient is up-to-date on age and gender recommend screening exception of DEXA scan which has been ordered.States she will be due for a colonoscopy this fall.  She is up-to-date on age and gender recommended immunizations with exception of pneumonia vaccine which will be updated today also due for tetanus and shingles vaccine which recommend she get her pharmacy    Patient Care Team:  Pablo Dow, DO as PCP - General (Internal Medicine)  Zak Marley DO as PCP - [employer] PCP     Review of Systems   Constitutional:  Negative for chills and fever.   HENT:  Negative for sore throat.    Eyes:  Negative for visual disturbance.   Respiratory:  Negative for cough and shortness of breath.    Cardiovascular:  Negative for chest pain, palpitations and leg swelling.   Gastrointestinal:  Negative for abdominal pain, blood in stool, diarrhea, nausea and vomiting.   Genitourinary:  Negative for dysuria and hematuria.   Skin:  Negative for rash.   Neurological:  Negative for dizziness, syncope and light-headedness.   Psychiatric/Behavioral:  Negative for agitation and confusion.        Objective   Vitals:  /84 (BP Location: Right arm, Patient Position: Sitting, BP Cuff Size: Adult)   Pulse 76   Temp 36.1 °C (97 °F)   Ht 1.543 m (5' 0.75\")   Wt 67.9 kg (149 lb 9.6 oz)   BMI 28.50 kg/m²       Physical Exam  MEASUREMENTS: Weight- 149.  CHEST: Lungs normal  CARDIOVASCULAR: Heart normal  ABDOMEN: Abdomen normal  EXTREMITIES: No edema  Physical Exam  Vitals and nursing note reviewed.   Constitutional:       General: She is not in acute distress.     Appearance: Normal " appearance. She is not ill-appearing, toxic-appearing or diaphoretic.   HENT:      Head: Normocephalic and atraumatic.      Nose: No rhinorrhea.      Mouth/Throat:      Mouth: Mucous membranes are moist.      Pharynx: Oropharynx is clear.   Eyes:      Extraocular Movements: Extraocular movements intact.      Pupils: Pupils are equal, round, and reactive to light.   Cardiovascular:      Rate and Rhythm: Normal rate and regular rhythm.      Heart sounds: Normal heart sounds. No murmur heard.  Pulmonary:      Effort: Pulmonary effort is normal. No respiratory distress.      Breath sounds: Normal breath sounds. No wheezing, rhonchi or rales.   Abdominal:      General: There is no distension.      Palpations: Abdomen is soft. There is no mass.      Tenderness: There is no abdominal tenderness. There is no guarding.   Musculoskeletal:      Cervical back: Neck supple.      Right lower leg: No edema.      Left lower leg: No edema.   Skin:     General: Skin is warm and dry.      Coloration: Skin is not pale.      Findings: No rash.   Neurological:      General: No focal deficit present.      Mental Status: She is alert and oriented to person, place, and time. Mental status is at baseline.   Psychiatric:         Mood and Affect: Mood normal.         Behavior: Behavior normal.         Thought Content: Thought content normal.         Judgment: Judgment normal.       Assessment & Plan  Benign essential hypertension  Blood pressure readings fluctuate between 120 to 150 mmHg at home. Current regimen includes Losartan 25 mg twice daily. Dizziness occurs with higher doses, necessitating split dosing.  - Consult with Dr. Flores regarding potential adjustment of Losartan dosage.    Hyperlipidemia  Cholesterol levels remain elevated despite weight loss.  She is intolerant to statins and hesitant about injection therapy due to fear of side effects. Discussed safety and efficacy of injection therapy with Repatha, but she remains  apprehensive. Weight loss efforts continue, but may not significantly impact cholesterol due to genetic factors.  - Continue weight loss efforts.  - Consider discussing injection therapy further with Dr. Flores.    Impaired glucose metabolism: Check hemoglobin A1c    Chronic kidney disease stage IIIa: Chronic, stable creatinine remained stable will check urine microalbumin prior to next office visit    History of colon cancer: She follows with a cancer specialist at [facility].  She had recent scans of the chest and abdomen pelvis that showed no evidence of metastatic disease is scheduled for a colonoscopy in the fall.    General Health Maintenance  She is up to date on most health maintenance measures. Discussed updating immunizations including shingles, tetanus, and pneumonia vaccines. The new shingles vaccine is 90% effective compared to the older one which was 60% effective.  - Consider updating shingles vaccine (Shingrix) at pharmacy.  - Consider updating tetanus vaccine (TDAP) at pharmacy.  - Administer pneumonia vaccine today.    Assessment & Plan  Menopause present    Orders:    XR DEXA bone density; Future    Encounter for immunization    Orders:    Pneumococcal conjugate vaccine, 20-valent (PREVNAR 20)    Vitamin D deficiency    Orders:    Vitamin D 25-Hydroxy,Total (for eval of Vitamin D levels); Future    Mixed hyperlipidemia    Orders:    CBC and Auto Differential; Future    Comprehensive Metabolic Panel; Future    Lipid Panel; Future    Benign essential hypertension    Orders:    CBC and Auto Differential; Future    Comprehensive Metabolic Panel; Future    Impaired glucose metabolism    Orders:    Hemoglobin A1C; Future    Stage 3a chronic kidney disease (Multi)    Orders:    CBC and Auto Differential; Future    Comprehensive Metabolic Panel; Future    Albumin-Creatinine Ratio, Urine Random; Future    History of colon cancer         Statin intolerance         Health maintenance  examination              Pablo Dow DO     This medical note was created with the assistance of artificial intelligence (AI) for documentation purposes. The content has been reviewed and confirmed by the healthcare provider for accuracy and completeness. Patient consented to the use of audio recording and use of AI during their visit.

## 2025-05-15 NOTE — ASSESSMENT & PLAN NOTE
Orders:    CBC and Auto Differential; Future    Comprehensive Metabolic Panel; Future    Albumin-Creatinine Ratio, Urine Random; Future

## 2025-05-15 NOTE — PROGRESS NOTES
Patient:  Bere Mota  YOB: 1940  MRN: [id number]       Chief Complaint/Active Symptoms:       Bere Mota is a 84 y.o. female who returns today for cardiac follow-up.    Patient refuses repatha. No chest pain or discomfort. No shortness of breath, no orthopnea or PND. No palpitations. Has occasional dizziness that she cannot place a specific cause to. No falls or syncope. No edema, no claudications.     It was recommended at her last appointment the patient start Repatha due to her marked hypercholesterolemia and statin intolerance.  After she was written for the medications she decided against it as she did not want to give herself injections did not want to spend the money and just did not want to take the medication.  She refuses to consider the medication even with the offer to have her come with her injection to have it given by one of the nurses here in the office setting.  She does not want any other alternative medications either.  She states she is just going to treat it with diet and exercise.    No other concerns.     Review of Systems   HENT: Negative.     Respiratory: Negative.     Cardiovascular: Negative.    Musculoskeletal:  Positive for arthralgias.   Neurological:  Positive for dizziness.   All other systems reviewed and are negative.      Objective:     Vitals:    05/15/25 1048   BP: 176/84   Pulse: 76   Temp: 36.1 °C (97 °F)       Vitals:    05/15/25 1048   Weight: 67.6 kg (149 lb)     Vitals:    05/15/25 1048 05/15/25 1117   BP: 176/84 128/78   BP Location:  Right arm   Patient Position:  Sitting   BP Cuff Size:  Adult   Pulse: 76    Temp: 36.1 °C (97 °F)    Weight: 67.6 kg (149 lb)    Height: 1.524 m (5')       Allergies:     Allergies[1]       Medications:     Current Outpatient Medications   Medication Instructions    aspirin 81 mg, oral, Daily    b complex 0.4 mg tablet 1 tablet, Every other day    cholecalciferol (VITAMIN D-3) 4,000 Units, Daily RT    evolocumab (REPATHA  "SURECLICK) 140 mg, subcutaneous, Every 14 days    folic acid/multivit-min/lutein (CENTRUM SILVER ORAL) 1 tablet, Daily    losartan (COZAAR) 25 mg, oral, 2 times daily    metoprolol succinate XL (TOPROL-XL) 25 mg, oral, Daily    nitroglycerin (NITROSTAT) 0.4 mg, Every 5 min PRN    zinc gluconate 50 mg tablet 50 mg of elemental zinc, Daily       Physical Examination:   GENERAL:  Well developed, well nourished, in no acute distress.  HEENT: NC AT, EOMI with anicteric sclera  NECK:  Supple, no JVD, no bruit.  CHEST:  Symmetric and nontender.  LUNGS:  Clear to auscultation bilaterally, normal respiratory effort.  HEART:  PMI is nondisplaced. RRR with normal S1 and S2, no S3, no mumur or rub. No carotid or abdominal bruits  EXTREMITIES:  Warm with good color, no clubbing or cyanosis.  There is no edema noted.  PERIPHERAL VASCULAR:  Pulses present and equally palpable; 2+ throughout.  MUSCULOSKELETAL: Mild arthritic changes  NEURO/PSYCH:  Alert and oriented times three with approppriate behavior and responses. Nonfocal motor examination with normal gait and ambulation  Skin: no rash or lesions on exposed skin or reported.    Lab:     CBC:   No results found for: \"WBC\", \"RBC\", \"HGB\", \"HCT\", \"PLT\"     CMP:    Lab Results   Component Value Date     04/16/2021    K 4.4 04/16/2021     04/16/2021    CO2 26 04/16/2021    BUN 29 (H) 04/16/2021    CREATININE 1.07 (H) 04/16/2021    GLUCOSE 106 (H) 04/16/2021    CALCIUM 9.7 04/16/2021       Magnesium:    No results found for: \"MG\"    Lipid Profile:    Lab Results   Component Value Date    TRIG 199 (H) 04/16/2021    HDL 51.0 04/16/2021       TSH:    No results found for: \"TSH\"    BNP:   No results found for: \"BNP\"     PT/INR:    No results found for: \"PROTIME\", \"INR\"    HgBA1c:    No results found for: \"HGBA1C\"    BMP:  Lab Results   Component Value Date     04/16/2021     11/04/2020     07/02/2020    K 4.4 04/16/2021    K 4.6 11/04/2020    K 4.4 " "07/02/2020     04/16/2021     (H) 11/04/2020     07/02/2020    CO2 26 04/16/2021    CO2 25 11/04/2020    CO2 25 07/02/2020    BUN 29 (H) 04/16/2021    BUN 31 (H) 11/04/2020    BUN 23 07/02/2020    CREATININE 1.07 (H) 04/16/2021    CREATININE 1.10 (H) 11/04/2020    CREATININE 1.03 07/02/2020       Cardiac Enzymes:    No results found for: \"TROPHS\"    Hepatic Function Panel:    Lab Results   Component Value Date    ALKPHOS 69 11/04/2020    ALT 16 11/04/2020    AST 19 11/04/2020    PROT 7.0 11/04/2020    BILITOT 0.5 11/04/2020         Diagnostic Studies:     EKG:   EKG today - sinus bradycardia, otherwise normal.     Radiology:     No orders to display     ASSESSMENT     Problem List Items Addressed This Visit       Atherosclerosis of coronary artery of native heart without angina pectoris - Primary    Relevant Orders    ECG 12 lead (Clinic Performed) (Completed)    Benign essential hypertension    Relevant Orders    ECG 12 lead (Clinic Performed) (Completed)    Hyperlipidemia    RCA occlusion (Multi)    Statin intolerance       PLAN   1.  Coronary artery disease stable without angina pectoris.  History of RCA occlusion.  Continue conservative medical management.  2.  Hypercholesterolemia with statin intolerance.  Patient's last LDL cholesterol was 192.  She is unwilling to change and reconsider and try PCSK9 inhibitors.  Unfortunately we talked to her at length about the adequacy of dietary therapy in terms of treatment with her known coronary artery disease but she her mind is set on this issue.  3.  Hypertension with reactive hypertension and labile blood pressure readings.  The patient's blood pressure initially was very high, repeat shows normal blood pressure readings we will not change her blood pressure medication at this time.    Will see the patient on an annual basis.  If she has any change in her functional capacity or symptoms she can return for an earlier appointment.               "     [1]   Allergies  Allergen Reactions    Ciprofloxacin Unknown, Anaphylaxis and Other     pain in throat and ears    Penicillins Unknown and Swelling    Statins-Hmg-Coa Reductase Inhibitors Unknown    Zetia [Ezetimibe] Myalgia

## 2025-06-02 ENCOUNTER — HOSPITAL ENCOUNTER (OUTPATIENT)
Dept: RADIOLOGY | Facility: CLINIC | Age: 85
Discharge: HOME | End: 2025-06-02
Payer: MEDICARE

## 2025-06-02 DIAGNOSIS — Z78.0 MENOPAUSE PRESENT: ICD-10-CM

## 2025-06-02 PROCEDURE — 77080 DXA BONE DENSITY AXIAL: CPT | Performed by: RADIOLOGY

## 2025-06-02 PROCEDURE — 77080 DXA BONE DENSITY AXIAL: CPT

## 2025-08-01 DIAGNOSIS — I10 BENIGN ESSENTIAL HYPERTENSION: ICD-10-CM

## 2025-08-01 RX ORDER — LOSARTAN POTASSIUM 25 MG/1
25 TABLET ORAL 2 TIMES DAILY
Qty: 180 TABLET | Refills: 3 | Status: SHIPPED | OUTPATIENT
Start: 2025-08-01 | End: 2026-08-01

## 2025-11-17 ENCOUNTER — APPOINTMENT (OUTPATIENT)
Dept: PRIMARY CARE | Facility: CLINIC | Age: 85
End: 2025-11-17
Payer: MEDICARE

## 2026-05-21 ENCOUNTER — APPOINTMENT (OUTPATIENT)
Dept: CARDIOLOGY | Facility: CLINIC | Age: 86
End: 2026-05-21
Payer: MEDICARE